# Patient Record
Sex: FEMALE | Race: WHITE | Employment: OTHER | URBAN - METROPOLITAN AREA
[De-identification: names, ages, dates, MRNs, and addresses within clinical notes are randomized per-mention and may not be internally consistent; named-entity substitution may affect disease eponyms.]

---

## 2021-01-13 DIAGNOSIS — Z23 NEED FOR VACCINATION: ICD-10-CM

## 2022-07-08 ENCOUNTER — HOSPITAL ENCOUNTER (OUTPATIENT)
Dept: RADIOLOGY | Facility: MEDICAL CENTER | Age: 82
End: 2022-07-08

## 2023-09-28 ENCOUNTER — HOSPITAL ENCOUNTER (OUTPATIENT)
Dept: RADIOLOGY | Facility: MEDICAL CENTER | Age: 83
End: 2023-09-28
Payer: MEDICARE

## 2024-03-22 ENCOUNTER — APPOINTMENT (OUTPATIENT)
Dept: RADIOLOGY | Facility: MEDICAL CENTER | Age: 84
DRG: 189 | End: 2024-03-22
Attending: STUDENT IN AN ORGANIZED HEALTH CARE EDUCATION/TRAINING PROGRAM
Payer: MEDICARE

## 2024-03-22 ENCOUNTER — HOSPITAL ENCOUNTER (INPATIENT)
Facility: MEDICAL CENTER | Age: 84
LOS: 2 days | DRG: 189 | End: 2024-03-25
Attending: STUDENT IN AN ORGANIZED HEALTH CARE EDUCATION/TRAINING PROGRAM | Admitting: STUDENT IN AN ORGANIZED HEALTH CARE EDUCATION/TRAINING PROGRAM
Payer: MEDICARE

## 2024-03-22 DIAGNOSIS — J44.1 COPD EXACERBATION (HCC): ICD-10-CM

## 2024-03-22 DIAGNOSIS — J96.21 ACUTE ON CHRONIC RESPIRATORY FAILURE WITH HYPOXIA (HCC): ICD-10-CM

## 2024-03-22 DIAGNOSIS — E43 SEVERE PROTEIN-CALORIE MALNUTRITION (HCC): ICD-10-CM

## 2024-03-22 PROBLEM — E46 PROTEIN CALORIE MALNUTRITION (HCC): Status: ACTIVE | Noted: 2024-03-22

## 2024-03-22 PROBLEM — R79.89 ELEVATED BRAIN NATRIURETIC PEPTIDE (BNP) LEVEL: Status: ACTIVE | Noted: 2024-03-22

## 2024-03-22 PROBLEM — J96.01 ACUTE HYPOXIC RESPIRATORY FAILURE (HCC): Status: ACTIVE | Noted: 2024-03-22

## 2024-03-22 PROCEDURE — 94669 MECHANICAL CHEST WALL OSCILL: CPT

## 2024-03-22 PROCEDURE — 99223 1ST HOSP IP/OBS HIGH 75: CPT | Mod: AI | Performed by: STUDENT IN AN ORGANIZED HEALTH CARE EDUCATION/TRAINING PROGRAM

## 2024-03-22 PROCEDURE — 94640 AIRWAY INHALATION TREATMENT: CPT

## 2024-03-22 PROCEDURE — G0378 HOSPITAL OBSERVATION PER HR: HCPCS

## 2024-03-22 PROCEDURE — 700101 HCHG RX REV CODE 250: Performed by: STUDENT IN AN ORGANIZED HEALTH CARE EDUCATION/TRAINING PROGRAM

## 2024-03-22 PROCEDURE — 71045 X-RAY EXAM CHEST 1 VIEW: CPT

## 2024-03-22 RX ORDER — DORZOLAMIDE HYDROCHLORIDE AND TIMOLOL MALEATE 20; 5 MG/ML; MG/ML
1 SOLUTION/ DROPS OPHTHALMIC 2 TIMES DAILY
Status: DISCONTINUED | OUTPATIENT
Start: 2024-03-22 | End: 2024-03-25 | Stop reason: HOSPADM

## 2024-03-22 RX ORDER — PREDNISONE 20 MG/1
40 TABLET ORAL DAILY
Status: DISCONTINUED | OUTPATIENT
Start: 2024-03-23 | End: 2024-03-25 | Stop reason: HOSPADM

## 2024-03-22 RX ORDER — BRIMONIDINE TARTRATE 2 MG/ML
1 SOLUTION/ DROPS OPHTHALMIC 2 TIMES DAILY
Status: DISCONTINUED | OUTPATIENT
Start: 2024-03-23 | End: 2024-03-25 | Stop reason: HOSPADM

## 2024-03-22 RX ORDER — IPRATROPIUM BROMIDE AND ALBUTEROL SULFATE 2.5; .5 MG/3ML; MG/3ML
3 SOLUTION RESPIRATORY (INHALATION)
Status: DISCONTINUED | OUTPATIENT
Start: 2024-03-22 | End: 2024-03-24

## 2024-03-22 RX ORDER — ALBUTEROL SULFATE 90 UG/1
2 AEROSOL, METERED RESPIRATORY (INHALATION)
Status: DISCONTINUED | OUTPATIENT
Start: 2024-03-22 | End: 2024-03-24

## 2024-03-22 RX ORDER — LATANOPROST 50 UG/ML
1 SOLUTION/ DROPS OPHTHALMIC EVERY EVENING
Status: DISCONTINUED | OUTPATIENT
Start: 2024-03-22 | End: 2024-03-25 | Stop reason: HOSPADM

## 2024-03-22 RX ORDER — LISINOPRIL 10 MG/1
10 TABLET ORAL DAILY
Status: DISCONTINUED | OUTPATIENT
Start: 2024-03-23 | End: 2024-03-25 | Stop reason: HOSPADM

## 2024-03-22 RX ORDER — FLUTICASONE PROPIONATE AND SALMETEROL 250; 50 UG/1; UG/1
1 POWDER RESPIRATORY (INHALATION)
Status: DISCONTINUED | OUTPATIENT
Start: 2024-03-22 | End: 2024-03-23

## 2024-03-22 RX ORDER — AZITHROMYCIN 500 MG/5ML
500 INJECTION, POWDER, LYOPHILIZED, FOR SOLUTION INTRAVENOUS EVERY 24 HOURS
Qty: 500 ML | Refills: 0 | Status: DISCONTINUED | OUTPATIENT
Start: 2024-03-23 | End: 2024-03-24

## 2024-03-22 RX ADMIN — IPRATROPIUM BROMIDE AND ALBUTEROL SULFATE 3 ML: .5; 3 SOLUTION RESPIRATORY (INHALATION) at 23:02

## 2024-03-22 ASSESSMENT — ENCOUNTER SYMPTOMS
HALLUCINATIONS: 0
SENSORY CHANGE: 0
EYE DISCHARGE: 0
PALPITATIONS: 0
FOCAL WEAKNESS: 0
BACK PAIN: 0
DIARRHEA: 0
CHILLS: 1
TREMORS: 0
HEADACHES: 0
WEIGHT LOSS: 0
SHORTNESS OF BREATH: 1
NECK PAIN: 0
HEMOPTYSIS: 0
FEVER: 0
SPUTUM PRODUCTION: 1
INSOMNIA: 0
NERVOUS/ANXIOUS: 0
SINUS PAIN: 0
DOUBLE VISION: 0
WHEEZING: 1
DIZZINESS: 0
COUGH: 1
TINGLING: 0
NAUSEA: 0
VOMITING: 0
EYE PAIN: 0
PHOTOPHOBIA: 0
ORTHOPNEA: 0
ABDOMINAL PAIN: 0
SPEECH CHANGE: 0

## 2024-03-22 ASSESSMENT — LIFESTYLE VARIABLES: SUBSTANCE_ABUSE: 0

## 2024-03-22 ASSESSMENT — COPD QUESTIONNAIRES
DO YOU EVER COUGH UP ANY MUCUS OR PHLEGM?: NO/ONLY WITH OCCASIONAL COLDS OR INFECTIONS
DURING THE PAST 4 WEEKS HOW MUCH DID YOU FEEL SHORT OF BREATH: NONE/LITTLE OF THE TIME
COPD SCREENING SCORE: 4
HAVE YOU SMOKED AT LEAST 100 CIGARETTES IN YOUR ENTIRE LIFE: YES

## 2024-03-22 ASSESSMENT — PATIENT HEALTH QUESTIONNAIRE - PHQ9
2. FEELING DOWN, DEPRESSED, IRRITABLE, OR HOPELESS: NOT AT ALL
SUM OF ALL RESPONSES TO PHQ9 QUESTIONS 1 AND 2: 0
1. LITTLE INTEREST OR PLEASURE IN DOING THINGS: NOT AT ALL

## 2024-03-23 ENCOUNTER — APPOINTMENT (OUTPATIENT)
Dept: CARDIOLOGY | Facility: MEDICAL CENTER | Age: 84
DRG: 189 | End: 2024-03-23
Payer: MEDICARE

## 2024-03-23 PROBLEM — J96.20 ACUTE ON CHRONIC RESPIRATORY FAILURE (HCC): Status: ACTIVE | Noted: 2024-03-23

## 2024-03-23 LAB
ALBUMIN SERPL BCP-MCNC: 3 G/DL (ref 3.2–4.9)
ALBUMIN/GLOB SERPL: 1 G/DL
ALP SERPL-CCNC: 114 U/L (ref 30–99)
ALT SERPL-CCNC: 12 U/L (ref 2–50)
ANION GAP SERPL CALC-SCNC: 11 MMOL/L (ref 7–16)
AST SERPL-CCNC: 20 U/L (ref 12–45)
BASOPHILS # BLD AUTO: 0.1 % (ref 0–1.8)
BASOPHILS # BLD: 0.02 K/UL (ref 0–0.12)
BILIRUB SERPL-MCNC: 0.5 MG/DL (ref 0.1–1.5)
BUN SERPL-MCNC: 34 MG/DL (ref 8–22)
CALCIUM ALBUM COR SERPL-MCNC: 9 MG/DL (ref 8.5–10.5)
CALCIUM SERPL-MCNC: 8.2 MG/DL (ref 8.5–10.5)
CHLORIDE SERPL-SCNC: 103 MMOL/L (ref 96–112)
CO2 SERPL-SCNC: 30 MMOL/L (ref 20–33)
CREAT SERPL-MCNC: 0.81 MG/DL (ref 0.5–1.4)
EOSINOPHIL # BLD AUTO: 0 K/UL (ref 0–0.51)
EOSINOPHIL NFR BLD: 0 % (ref 0–6.9)
ERYTHROCYTE [DISTWIDTH] IN BLOOD BY AUTOMATED COUNT: 56.1 FL (ref 35.9–50)
GFR SERPLBLD CREATININE-BSD FMLA CKD-EPI: 71 ML/MIN/1.73 M 2
GLOBULIN SER CALC-MCNC: 3.1 G/DL (ref 1.9–3.5)
GLUCOSE SERPL-MCNC: 130 MG/DL (ref 65–99)
HCT VFR BLD AUTO: 52.2 % (ref 37–47)
HGB BLD-MCNC: 16.7 G/DL (ref 12–16)
IMM GRANULOCYTES # BLD AUTO: 0.08 K/UL (ref 0–0.11)
IMM GRANULOCYTES NFR BLD AUTO: 0.6 % (ref 0–0.9)
LV EJECT FRACT  99904: 60
LV EJECT FRACT MOD 2C 99903: 72.12
LV EJECT FRACT MOD 4C 99902: 64.85
LV EJECT FRACT MOD BP 99901: 68.87
LYMPHOCYTES # BLD AUTO: 0.43 K/UL (ref 1–4.8)
LYMPHOCYTES NFR BLD: 3.1 % (ref 22–41)
MCH RBC QN AUTO: 31.3 PG (ref 27–33)
MCHC RBC AUTO-ENTMCNC: 32 G/DL (ref 32.2–35.5)
MCV RBC AUTO: 97.8 FL (ref 81.4–97.8)
MONOCYTES # BLD AUTO: 0.12 K/UL (ref 0–0.85)
MONOCYTES NFR BLD AUTO: 0.9 % (ref 0–13.4)
NEUTROPHILS # BLD AUTO: 13.41 K/UL (ref 1.82–7.42)
NEUTROPHILS NFR BLD: 95.3 % (ref 44–72)
NRBC # BLD AUTO: 0 K/UL
NRBC BLD-RTO: 0 /100 WBC (ref 0–0.2)
NT-PROBNP SERPL IA-MCNC: ABNORMAL PG/ML (ref 0–125)
PLATELET # BLD AUTO: 213 K/UL (ref 164–446)
PMV BLD AUTO: 10.7 FL (ref 9–12.9)
POTASSIUM SERPL-SCNC: 4.8 MMOL/L (ref 3.6–5.5)
PROCALCITONIN SERPL-MCNC: 0.09 NG/ML
PROT SERPL-MCNC: 6.1 G/DL (ref 6–8.2)
RBC # BLD AUTO: 5.34 M/UL (ref 4.2–5.4)
SODIUM SERPL-SCNC: 144 MMOL/L (ref 135–145)
TROPONIN T SERPL-MCNC: 38 NG/L (ref 6–19)
WBC # BLD AUTO: 14.1 K/UL (ref 4.8–10.8)

## 2024-03-23 PROCEDURE — 83880 ASSAY OF NATRIURETIC PEPTIDE: CPT

## 2024-03-23 PROCEDURE — 99232 SBSQ HOSP IP/OBS MODERATE 35: CPT | Performed by: INTERNAL MEDICINE

## 2024-03-23 PROCEDURE — 97602 WOUND(S) CARE NON-SELECTIVE: CPT

## 2024-03-23 PROCEDURE — 700102 HCHG RX REV CODE 250 W/ 637 OVERRIDE(OP): Performed by: STUDENT IN AN ORGANIZED HEALTH CARE EDUCATION/TRAINING PROGRAM

## 2024-03-23 PROCEDURE — 84145 PROCALCITONIN (PCT): CPT

## 2024-03-23 PROCEDURE — 700111 HCHG RX REV CODE 636 W/ 250 OVERRIDE (IP)

## 2024-03-23 PROCEDURE — 700101 HCHG RX REV CODE 250: Performed by: STUDENT IN AN ORGANIZED HEALTH CARE EDUCATION/TRAINING PROGRAM

## 2024-03-23 PROCEDURE — 84484 ASSAY OF TROPONIN QUANT: CPT

## 2024-03-23 PROCEDURE — 94664 DEMO&/EVAL PT USE INHALER: CPT

## 2024-03-23 PROCEDURE — 96372 THER/PROPH/DIAG INJ SC/IM: CPT

## 2024-03-23 PROCEDURE — 94669 MECHANICAL CHEST WALL OSCILL: CPT

## 2024-03-23 PROCEDURE — 85025 COMPLETE CBC W/AUTO DIFF WBC: CPT

## 2024-03-23 PROCEDURE — 94640 AIRWAY INHALATION TREATMENT: CPT

## 2024-03-23 PROCEDURE — 96365 THER/PROPH/DIAG IV INF INIT: CPT

## 2024-03-23 PROCEDURE — 96375 TX/PRO/DX INJ NEW DRUG ADDON: CPT

## 2024-03-23 PROCEDURE — 770020 HCHG ROOM/CARE - TELE (206)

## 2024-03-23 PROCEDURE — 93306 TTE W/DOPPLER COMPLETE: CPT | Mod: 26 | Performed by: INTERNAL MEDICINE

## 2024-03-23 PROCEDURE — 700105 HCHG RX REV CODE 258: Performed by: STUDENT IN AN ORGANIZED HEALTH CARE EDUCATION/TRAINING PROGRAM

## 2024-03-23 PROCEDURE — A9270 NON-COVERED ITEM OR SERVICE: HCPCS | Performed by: STUDENT IN AN ORGANIZED HEALTH CARE EDUCATION/TRAINING PROGRAM

## 2024-03-23 PROCEDURE — 80053 COMPREHEN METABOLIC PANEL: CPT

## 2024-03-23 PROCEDURE — 93306 TTE W/DOPPLER COMPLETE: CPT

## 2024-03-23 PROCEDURE — 700111 HCHG RX REV CODE 636 W/ 250 OVERRIDE (IP): Performed by: STUDENT IN AN ORGANIZED HEALTH CARE EDUCATION/TRAINING PROGRAM

## 2024-03-23 PROCEDURE — 36415 COLL VENOUS BLD VENIPUNCTURE: CPT

## 2024-03-23 RX ORDER — FUROSEMIDE 10 MG/ML
20 INJECTION INTRAMUSCULAR; INTRAVENOUS ONCE
Status: COMPLETED | OUTPATIENT
Start: 2024-03-23 | End: 2024-03-23

## 2024-03-23 RX ORDER — ENOXAPARIN SODIUM 100 MG/ML
30 INJECTION SUBCUTANEOUS DAILY
Status: DISCONTINUED | OUTPATIENT
Start: 2024-03-23 | End: 2024-03-25 | Stop reason: HOSPADM

## 2024-03-23 RX ADMIN — PREDNISONE 40 MG: 20 TABLET ORAL at 05:39

## 2024-03-23 RX ADMIN — BRIMONIDINE TARTRATE 1 DROP: 2 SOLUTION OPHTHALMIC at 04:26

## 2024-03-23 RX ADMIN — IPRATROPIUM BROMIDE AND ALBUTEROL SULFATE 3 ML: .5; 3 SOLUTION RESPIRATORY (INHALATION) at 02:35

## 2024-03-23 RX ADMIN — LISINOPRIL 10 MG: 10 TABLET ORAL at 05:39

## 2024-03-23 RX ADMIN — IPRATROPIUM BROMIDE AND ALBUTEROL SULFATE 3 ML: .5; 3 SOLUTION RESPIRATORY (INHALATION) at 22:32

## 2024-03-23 RX ADMIN — IPRATROPIUM BROMIDE AND ALBUTEROL SULFATE 3 ML: .5; 3 SOLUTION RESPIRATORY (INHALATION) at 19:12

## 2024-03-23 RX ADMIN — MOMETASONE FUROATE AND FORMOTEROL FUMARATE DIHYDRATE 2 PUFF: 200; 5 AEROSOL RESPIRATORY (INHALATION) at 19:15

## 2024-03-23 RX ADMIN — FUROSEMIDE 20 MG: 10 INJECTION INTRAMUSCULAR; INTRAVENOUS at 18:23

## 2024-03-23 RX ADMIN — DORZOLAMIDE HYDROCHLORIDE AND TIMOLOL MALEATE 1 DROP: 20; 5 SOLUTION/ DROPS OPHTHALMIC at 04:21

## 2024-03-23 RX ADMIN — IPRATROPIUM BROMIDE AND ALBUTEROL SULFATE 3 ML: .5; 3 SOLUTION RESPIRATORY (INHALATION) at 07:23

## 2024-03-23 RX ADMIN — IPRATROPIUM BROMIDE AND ALBUTEROL SULFATE 3 ML: .5; 3 SOLUTION RESPIRATORY (INHALATION) at 10:27

## 2024-03-23 RX ADMIN — TIOTROPIUM BROMIDE INHALATION SPRAY 5 MCG: 3.12 SPRAY, METERED RESPIRATORY (INHALATION) at 07:28

## 2024-03-23 RX ADMIN — LATANOPROST 1 DROP: 50 SOLUTION OPHTHALMIC at 04:16

## 2024-03-23 RX ADMIN — LATANOPROST 1 DROP: 50 SOLUTION OPHTHALMIC at 16:36

## 2024-03-23 RX ADMIN — BRIMONIDINE TARTRATE 1 DROP: 2 SOLUTION OPHTHALMIC at 16:49

## 2024-03-23 RX ADMIN — DORZOLAMIDE HYDROCHLORIDE AND TIMOLOL MALEATE 1 DROP: 20; 5 SOLUTION/ DROPS OPHTHALMIC at 16:24

## 2024-03-23 RX ADMIN — ENOXAPARIN SODIUM 30 MG: 100 INJECTION SUBCUTANEOUS at 17:06

## 2024-03-23 RX ADMIN — AZITHROMYCIN DIHYDRATE 500 MG: 500 INJECTION, POWDER, LYOPHILIZED, FOR SOLUTION INTRAVENOUS at 05:57

## 2024-03-23 RX ADMIN — CEFTRIAXONE SODIUM 2000 MG: 10 INJECTION, POWDER, FOR SOLUTION INTRAVENOUS at 05:48

## 2024-03-23 ASSESSMENT — PATIENT HEALTH QUESTIONNAIRE - PHQ9
2. FEELING DOWN, DEPRESSED, IRRITABLE, OR HOPELESS: NEARLY EVERY DAY
7. TROUBLE CONCENTRATING ON THINGS, SUCH AS READING THE NEWSPAPER OR WATCHING TELEVISION: NOT AT ALL
3. TROUBLE FALLING OR STAYING ASLEEP OR SLEEPING TOO MUCH: NEARLY EVERY DAY
1. LITTLE INTEREST OR PLEASURE IN DOING THINGS: SEVERAL DAYS
9. THOUGHTS THAT YOU WOULD BE BETTER OFF DEAD, OR OF HURTING YOURSELF: NOT AT ALL
6. FEELING BAD ABOUT YOURSELF - OR THAT YOU ARE A FAILURE OR HAVE LET YOURSELF OR YOUR FAMILY DOWN: SEVERAL DAYS
SUM OF ALL RESPONSES TO PHQ QUESTIONS 1-9: 13
5. POOR APPETITE OR OVEREATING: NOT AT ALL
SUM OF ALL RESPONSES TO PHQ9 QUESTIONS 1 AND 2: 4
4. FEELING TIRED OR HAVING LITTLE ENERGY: MORE THAN HALF THE DAYS
8. MOVING OR SPEAKING SO SLOWLY THAT OTHER PEOPLE COULD HAVE NOTICED. OR THE OPPOSITE, BEING SO FIGETY OR RESTLESS THAT YOU HAVE BEEN MOVING AROUND A LOT MORE THAN USUAL: NEARLY EVERY DAY

## 2024-03-23 ASSESSMENT — COGNITIVE AND FUNCTIONAL STATUS - GENERAL
HELP NEEDED FOR BATHING: TOTAL
STANDING UP FROM CHAIR USING ARMS: TOTAL
PERSONAL GROOMING: A LITTLE
WALKING IN HOSPITAL ROOM: TOTAL
TURNING FROM BACK TO SIDE WHILE IN FLAT BAD: A LITTLE
EATING MEALS: A LITTLE
MOVING FROM LYING ON BACK TO SITTING ON SIDE OF FLAT BED: A LOT
CLIMB 3 TO 5 STEPS WITH RAILING: TOTAL
DRESSING REGULAR UPPER BODY CLOTHING: TOTAL
DAILY ACTIVITIY SCORE: 11
SUGGESTED CMS G CODE MODIFIER DAILY ACTIVITY: CL
SUGGESTED CMS G CODE MODIFIER MOBILITY: CM
MOBILITY SCORE: 9
DRESSING REGULAR LOWER BODY CLOTHING: A LOT
TOILETING: TOTAL
MOVING TO AND FROM BED TO CHAIR: TOTAL

## 2024-03-23 ASSESSMENT — ENCOUNTER SYMPTOMS
SORE THROAT: 0
CHILLS: 0
COUGH: 1
DIZZINESS: 0
WHEEZING: 1
PALPITATIONS: 0
NAUSEA: 0
HEADACHES: 0
BACK PAIN: 0
VOMITING: 0
ABDOMINAL PAIN: 0
MYALGIAS: 0
SHORTNESS OF BREATH: 1
FEVER: 0

## 2024-03-23 ASSESSMENT — LIFESTYLE VARIABLES
EVER HAD A DRINK FIRST THING IN THE MORNING TO STEADY YOUR NERVES TO GET RID OF A HANGOVER: NO
HAVE PEOPLE ANNOYED YOU BY CRITICIZING YOUR DRINKING: NO
TOTAL SCORE: 0
AVERAGE NUMBER OF DAYS PER WEEK YOU HAVE A DRINK CONTAINING ALCOHOL: 7
EVER FELT BAD OR GUILTY ABOUT YOUR DRINKING: NO
DOES PATIENT WANT TO STOP DRINKING: NO
HAVE YOU EVER FELT YOU SHOULD CUT DOWN ON YOUR DRINKING: NO
ON A TYPICAL DAY WHEN YOU DRINK ALCOHOL HOW MANY DRINKS DO YOU HAVE: 1
CONSUMPTION TOTAL: INCOMPLETE
ALCOHOL_USE: YES
TOTAL SCORE: 0
TOTAL SCORE: 0

## 2024-03-23 NOTE — RESPIRATORY CARE
"  COPD EDUCATION by COPD CLINICAL EDUCATOR  3/23/2024  at  10:51 AM by Ara James, RRT     Patient interviewed by education team.  Patient declined to participate in the full bedside program.  Therefore, a short intervention has been conducted.  A comprehensive packet including information about COPD, types of treatments to manage their disease and safe home Oxygen usage was provided and reviewed with patient at the bedside. She lives in [place], [place] with family close by. Her PCP manages here lung disease.Reviewed care and cleaning of her respiratory equipment (nebulizer, flutter). Provided spacer for her rescue inhaler with instruction.    COPD Screen  COPD Risk Screening  Do you have a history of COPD?: Yes  Do you have a Pulmonologist?: No  COPD Population Screener  During the past 4 weeks, how much did you feel short of breath?: None/Little of the time  Do you ever cough up any mucus or phlegm?: No/only with occasional colds or infections  In the past 12 months, you do less than you used to because of your breathing problems: Disagree/unsure  Have you smoked at least 100 cigarettes in your entire life?: Yes  How old are you?: 60+  COPD Screening Score: 4  COPD Coordinator Not Recommended: Yes    COPD Assessment  COPD Clinical Specialists ONLY  COPD Education Initiated: Yes--Short Intervention  Is this a COPD exacerbation patient?: Yes ([facility] team following)  [employer]: none prior  Physician Name: Shara Villareal in [place] per pt. Family to schedule discharge appt within 7-10 days  Pulmonologist Name: PCP manages; gave here regional list of groups  Referrals Initiated: Yes  Smoking Cessation: N/A (quit years ago)  Home Health Care:  (TBD at this encounter)  Mobile Urgent Care Services: N/A (out of area)  [facility]: N/A  $ Demo/Eval of SVN's, MDI's and Aerosols: Yes  Interdisciplinary Rounds: Attendance at Rounds (30 Min)    PFT Results  No results found for: \"PFT\"  (OP) Pulmonary Function " "Testing:  (pt denies PFT testing)    [facility], [facility] within [facility] provides bedside medication delivery for all eligible patients at discharge.  Would you like to opt out of this program for any reason?: No - Stay Opted In  Reason the patient would like to opt out of Bedside Medication Delivery at Discharge?: Patient prefers another pharmacy ([facility])     MY COPD ACTION PLAN     It is recommended that patients and physicians /healthcare providers complete this action plan together. This plan should be discussed at each physician visit and updated as needed.    The green, yellow and red zones show groups of symptoms of COPD. This list of symptoms is not comprehensive, and you may experience other symptoms. In the \"Actions\" column, your healthcare provider has recommended actions for you to take based on your symptoms.    Patient Name: Yesenia Dove   YOB: 1940   Last Updated on: 3/23/2024 10:51 AM   Green Zone:  I am doing well today Actions     Usual activitiy and exercise level   Take daily medications     Usual amounts of cough and phlegm/mucus   Use oxygen as prescribed     Sleep well at night   Continue regular exercise/diet plan     Appetite is good   At all times avoid cigarette smoke, inhaled irritants     Daily Medications (these medications are taken every day):   Fluticasone and Salmeterol (Wixela)  Tiotropium Bromide Monohydrate (Spiriva) 1 Puff  2 Puffs Twice daily  Once daily     Additional Information:  Remember to rinse mouth after using your Wixela    Yellow Zone:  I am having a bad day or a COPD flare Actions     More breathless than usual   Continue daily medications     I have less energy for my daily activities   Use quick relief inhaler as ordered     Increased or thicker phlegm/mucus   Use oxygen as prescribed     Using quick relief inhaler/nebulizer more often   Get plenty of rest     Swelling of ankles more than usual   Use pursed lip breathing     More coughing than usual   " "At all times avoid cigarette smoke, inhaled irritants     I feel like I have a \"chest cold\"     Poor sleep and my symptoms woke me up     My appetite is not good     My medicine is not helping      Call provider immediately if symptoms don’t improve     Continue daily medications, add rescue medications:   Albuterol  Albuterol 3mL via nebulizer  2 Puffs          Medications to be used during a flare up, (as Discussed with Provider):           Additional Information:  Use your rescue medication as directed by your Doctor  Keep your nebulizer clean daily    Red Zone:  I need urgent medical care Actions     Severe shortness of breath even at rest   Call 911 or seek medical care immediately     Not able to do any activity because of breathing      Fever or shaking chills      Feeling confused or very drowsy       Chest pains      Coughing up blood                  "

## 2024-03-23 NOTE — CARE PLAN
Respiratory Update    Q4 duoneb and QID PEP  Pt tolerating current treatments well with no adverse reactions.     Problem: Bronchoconstriction  Goal: Improve in air movement and diminished wheezing  Description: Target End Date:  2 to 3 days    1.  Implement inhaled treatments  2.  Evaluate and manage medication effects  Outcome: Progressing

## 2024-03-23 NOTE — THERAPY
Physical Therapy Contact Note    Patient Name: Yesenia Dove  Age:  83 y.o., Sex:  female  Medical Record #: [id number]  Today's Date: 3/23/2024    PT consult received, chart reviewed will follow for full eval; of note, chest xray reporting proximal right humeral fx without note in MD note; please clarify WB/reason prior to PT evaluation;     Mabel BLOCK, PT,  [phone]

## 2024-03-23 NOTE — PROGRESS NOTES
Pt. Is A&Ox4, denies chest pain, denies pain, upon assessment pt. Noted to have increased sob with communication. Pt. Has audible s1 and s2, inspiratory and expiratory wheezes, productive cough scant white secretions noted. RT notified for duoneb tx. Pt. States she does not use supplemental O2 at home, currently on 4L NC at 92%. LBM 3/22/2024, denies abdominal tenderness and passing gas. Pt. Noted to be incontinent of urine, pure wick in place and functioning. 4 eyes skin assessment performed pt. Pictures taken and uploaded to chart. Notable stage 2 pressure ulcer to sacrum, site cleansed with soap and water, barrier cream in place, mepelex in place, pt. Q2 hr turn, x 1 staff assist, tolerates well, JESU matress in place. Redness to the rt. and lt. lower extremities, +2 bilat lower extremity noted, +2 pedal pulses, cap refill less than 3 sec, extremities negative for skin breakdown, heel protectors in place. SCDs in place and functioning.   Nutrition pt. States she has experienced weight loss due to full dentures. States chewing is difficult.   Pt. Oriented to room and facility, educated to call for assistance as last known fall was x6 mo, bed in lowest position, call light in place, bed alarm in place, pt. Educated to call light system, verbalized understanding.

## 2024-03-23 NOTE — ASSESSMENT & PLAN NOTE
Patient with known history of COPD.  She has good outpatient follow-up.  She takes Breo and albuterol at home  -DuoNebs and albuterol as needed  -Respiratory therapy consult  -Titrate oxygen to 88 to 92%

## 2024-03-23 NOTE — HOSPITAL COURSE
83-year-old female with history of COPD not on baseline oxygen at home who presents on 3/22 as a transfer from outside facility for acute on chronic hypoxic respiratory failure. Patient has been having progressively worsening shortness of breath with associated productive cough for over 1 week.  She has been having to use her albuterol inhaler more frequently over the past couple of days.  She was treated with azithromycin and Solu-Medrol at the outside facility and was found to have an elevated BNP concerning for volume overload at that time.  The outside facility did not have echo capabilities, thus she was transferred here.  BNP here was 14,872.  Chest x-ray notable for trace bilateral pleural effusions and pulmonary edema.  She is needing 3 L of oxygen to maintain saturations.  Echo was ordered for further evaluation.  Appropriate COPD exacerbation therapy have been initiated.

## 2024-03-23 NOTE — H&P
Hospital Medicine History & Physical Note    Date of Service  3/22/2024    Primary Care Physician  No primary care provider on file.        Code Status  Full Code    Chief Complaint  No chief complaint on file.      History of Presenting Illness  Yesenia Dove is a 83 y.o. female with past med history of COPD  who presented 3/22/2024 as a transfer from outside facility.  Patient has a known history of COPD, not oxygen dependent at home.  States that over the last week, she has had progressively worsening shortness of breath.  She has had increased mucus production and worsening cough as well.  Denies any fevers, but noticed some chills today.  She does take Wixela and Spiriva at home.  She states that she has been using her albuterol inhaler more frequently over the last few days.  At the outside facility, there is concern for COPD exacerbation, patient was treated with azithromycin and Solu-Medrol.  Patient states that she has had some clinical improvement.  She also had an elevated proBNP, there is some concern for volume overload at that time.  She was given a dose of Lasix as well.    I discussed the plan of care with patient.    Review of Systems  Review of Systems   Constitutional:  Positive for chills. Negative for fever, malaise/fatigue and weight loss.   HENT:  Negative for congestion, ear discharge, ear pain, nosebleeds and sinus pain.    Eyes:  Negative for double vision, photophobia, pain and discharge.   Respiratory:  Positive for cough, sputum production, shortness of breath and wheezing. Negative for hemoptysis.    Cardiovascular:  Negative for chest pain, palpitations and orthopnea.   Gastrointestinal:  Negative for abdominal pain, diarrhea, nausea and vomiting.   Genitourinary:  Negative for dysuria, frequency, hematuria and urgency.   Musculoskeletal:  Negative for back pain and neck pain.   Neurological:  Negative for dizziness, tingling, tremors, sensory change, speech change, focal weakness  and headaches.   Psychiatric/Behavioral:  Negative for hallucinations, substance abuse and suicidal ideas. The patient is not nervous/anxious and does not have insomnia.        Past Medical History   has no past medical history on file.    Surgical History   has no past surgical history on file.     Family History  family history is not on file.   Family history reviewed with patient. There is no family history that is pertinent to the chief complaint.     Social History       Allergies  Not on File    Medications  Prior to Admission Medications   Prescriptions Last Dose Informant Patient Reported? Taking?   ALPHAGAN P 0.1 % Solution   Yes No   SPIRIVA RESPIMAT 2.5 MCG/ACT Aero Soln   Yes No   WIXELA INHUB 250-50 MCG/ACT AEROSOL POWDER, BREATH ACTIVATED   Yes No   dorzolamide-timolol (COSOPT) 22.3-6.8 MG/ML Solution   Yes No   latanoprost (XALATAN) 0.005 % Solution   Yes No   lisinopril (PRINIVIL) 10 MG Tab   Yes No   Sig: Take 10 mg by mouth every day.      Facility-Administered Medications: None       Physical Exam  Temp:  [36.8 °C (98.3 °F)] 36.8 °C (98.3 °F)  Pulse:  [80] 80  Resp:  [16] 16  BP: (132)/(72) 132/72  SpO2:  [90 %] 90 %  Blood Pressure : 132/72   Temperature: 36.8 °C (98.3 °F)   Pulse: 80   Respiration: 16   Pulse Oximetry: 90 %       Physical Exam  Constitutional:       General: She is not in acute distress.     Appearance: Normal appearance. She is normal weight. She is not ill-appearing, toxic-appearing or diaphoretic.   HENT:      Head: Normocephalic and atraumatic.      Nose: Nose normal.      Mouth/Throat:      Mouth: Mucous membranes are moist.   Eyes:      Extraocular Movements: Extraocular movements intact.      Pupils: Pupils are equal, round, and reactive to light.   Cardiovascular:      Rate and Rhythm: Normal rate and regular rhythm.      Pulses: Normal pulses.      Heart sounds: Normal heart sounds. No murmur heard.     No friction rub. No gallop.   Pulmonary:      Effort: Pulmonary  "effort is normal. No respiratory distress.      Breath sounds: No stridor. Wheezing present. No rhonchi or rales.   Chest:      Chest wall: No tenderness.   Abdominal:      General: Abdomen is flat. There is no distension.      Palpations: Abdomen is soft. There is no mass.      Tenderness: There is no abdominal tenderness. There is no guarding or rebound.      Hernia: No hernia is present.   Musculoskeletal:         General: No swelling, tenderness, deformity or signs of injury.      Right lower leg: No edema.      Left lower leg: No edema.      Comments:      Skin:     General: Skin is warm and dry.      Capillary Refill: Capillary refill takes less than 2 seconds.      Coloration: Skin is not jaundiced or pale.      Findings: No bruising, erythema, lesion or rash.   Neurological:      General: No focal deficit present.      Mental Status: She is alert and oriented to person, place, and time. Mental status is at baseline.      Cranial Nerves: No cranial nerve deficit.      Sensory: No sensory deficit.      Motor: No weakness.      Coordination: Coordination normal.   Psychiatric:         Mood and Affect: Mood normal.         Behavior: Behavior normal.         Laboratory:          No results for input(s): \"ALTSGPT\", \"ASTSGOT\", \"ALKPHOSPHAT\", \"TBILIRUBIN\", \"DBILIRUBIN\", \"GAMMAGT\", \"AMYLASE\", \"LIPASE\", \"ALB\", \"PREALBUMIN\", \"GLUCOSE\" in the last 72 hours.      No results for input(s): \"NTPROBNP\" in the last 72 hours.      No results for input(s): \"TROPONINT\" in the last 72 hours.    Imaging:  OUTSIDE IMAGES-DX CHEST   Final Result      DX-CHEST-LIMITED (1 VIEW)    (Results Pending)       X-Ray:  I have personally reviewed the images and compared with prior images.  EKG:  I have personally reviewed the images and compared with prior images.    Assessment/Plan:  Justification for Admission Status  I anticipate this patient will require at least two midnights for appropriate medical management, necessitating inpatient " admission because acute hypoxic respiratory failure secondary to COPD exacerbation    Patient will need a Telemetry bed on MEDICAL service .  The need is secondary to COPD exacerbation.    * COPD exacerbation (HCC)  Assessment & Plan  Patient with known history of COPD.  She has good outpatient follow-up.  She takes Breo and albuterol at home  DuoNebs and albuterol as needed  Respiratory therapy consult  Titrate oxygen to 88 to 92%  Continue with empiric antibiotics, ceftriaxone and azithromycin      Acute hypoxic respiratory failure (HCC)  Assessment & Plan  Patient currently wearing 4 L supplemental oxygen.  She is on oxygen pendant at home.  Likely secondary to COPD exacerbation  Continue with home inhalers  Albuterol DuoNebs as needed  RT protocol  Continue ceftriaxone azithromycin  Follow-up repeat chest x-ray      Protein calorie malnutrition (HCC)  Assessment & Plan  Nutrition consult placed    Elevated brain natriuretic peptide (BNP) level  Assessment & Plan  Patient was found to have an elevated proBNP at the outside facility.  There is concern for volume overload and lower extremity swelling.  She was given a dose of Lasix  On my exam, patient appears to be euvolemic  Follow-up repeat chest x-ray  No indication for further Lasix at this point  Holding off on echocardiogram at this time        VTE prophylaxis: enoxaparin ppx

## 2024-03-23 NOTE — PROGRESS NOTES
[facility]PITAL[facility] TRIAGE OFFICER DIRECT ADMISSION REPORT  Transferring facility: [facility]  Transferring physician: Dr. Christensen  Chief complaint: Shortness of breath  Pertinent history & patient course: 83-year-old female with history of COPD, high functioning presents to outside facility ER for 1 week of shortness of breath.  Found to be hypoxic with oxygenation in low 70s, responded to 2 L nasal cannula.  At outside facility, found to have BNP 1000, troponin 90.  EKG showing normal sinus rhythm with T wave inversion in leads V3 and V4 (however seen on previous EKGs).  Treated with Lasix, loading dose of aspirin, Solu-Medrol, Zithromax, DuoNebs.  Request be transferred for COPD exacerbation/suspected new onset CHF exacerbation.  No TTE available at outside facility.  Code Status: Full  Further work up or recommendations per triage officer prior to transfer: None  Consultants called prior to transfer and pertinent input from consultants: None  Patient accepted for transfer: Yes  Consultants to be called upon arrival: None needed at this time  Admission status: Inpatient.   Floor requested: CDU       Please inform the triage officer upon arrival of the patient to [facility] for assignment of a hospitalist to perform admission.      For any question or concerns regarding the care of this patient, please reach out to the assigned hospita

## 2024-03-23 NOTE — ASSESSMENT & PLAN NOTE
Patient with history of COPD not on baseline oxygen.  Currently using 3 L supplemental oxygen to maintain saturations, weaning as tolerated.  Likely secondary to COPD exacerbation versus heart failure.   - RT protocol  - Torsten Solorio  - Prednisone 40 mg x 5 days (3/23 to 3/27)  - Azithromycin 500 mg with last dose 3/24  - Echo: EF 60%, mild to moderate aortic insufficiency, biatrial dilation  - IV Lasix 20 mg once; significant improvement in BNP. ? Severe AECOPD

## 2024-03-23 NOTE — CARE PLAN
The patient is Watcher - Medium risk of patient condition declining or worsening    Shift Goals  Clinical Goals: Maintain Oxygenation  Patient Goals: Call for safety, Maintian oxygenation.    Progress made toward(s) clinical / shift goals:      Problem: Knowledge Deficit - Standard  Goal: Patient and family/care givers will demonstrate understanding of plan of care, disease process/condition, diagnostic tests and medications  Description: Target End Date:  1-3 days or as soon as patient condition allows    Document in Patient Education    1.  Patient and family/caregiver oriented to unit, equipment, visitation policy and means for communicating concern  2.  Complete/review Learning Assessment  3.  Assess knowledge level of disease process/condition, treatment plan, diagnostic tests and medications  4.  Explain disease process/condition, treatment plan, diagnostic tests and medications  Outcome: Progressing     Problem: Knowledge Deficit - COPD  Goal: Patient/significant other demonstrates understanding of disease process, utilization of the Action Plan, medications and discharge instruction  Description: Target End Date:  1-3 days or as soon as patient condition allows    Document in Patient Education    1.  Discuss the importance of medical follow-up care, periodic chest x-rays, sputum cultures  2.  Review worsening signs and symptoms of COPD flare and exacerbation and its management  3.  Discuss respiratory medications, side effects, adverse reactions  4.  Demonstrate technique for using a metered-dose inhaler (MDI) and utilization of a spacer  5.  Review the COPD Action Plan  6.  Instruct and reinforce the rationale for breathing exercises, coughing effectively, and general conditioning exercises  7.  Stress importance of oral care and dental hygiene  8.  Discuss the importance of avoiding people with active respiratory infections and need for routine influenza and pneumococcal vaccinations  9.  Discuss factors  that may trigger condition and encourage patient/significant other to explore ways to control these factors in and around the home and work setting  10. Review the harmful effects of smoking and advise cessation of smoking  11. Provide information about activity limitations and alternating activities with rest periods to prevent fatigue  12. Instruct asthmatic patient of use of peak flow meter, as appropriate  13. Review oxygen requirements and dosage, safe use of oxygen, and refer to the supplier as indicated  14. Educate patient/family/caregiver on the use of Nasal Intermittent Positive Pressure Ventilation (NIPPV) and possible adverse reactions  Outcome: Progressing     Problem: Risk for Infection - COPD  Goal: Patient will remain free from signs and symptoms of infection  Description: Target End Date:  Prior to discharge or change in level of care    1.  Infection prevention measures  2.  Instruct patient regarding signs and symptoms of infection  3.  Review the importance of breathing exercises, effective cough, frequent position changes, and adequate fluid intake  4.  Recommend rinsing mouth with water and spitting, not swallowing, or use of a spacer on the mouthpiece of inhaled corticosteroids  Outcome: Progressing     Problem: Nutrition - Advanced  Goal: Patient will display progressive weight gain toward goal have adequate food and fluid intake  Description: Target End Date:  Prior to discharge or change in level of care    1.  Daily weights  2.  Monitor dietary intake  3.  Promote systemic fluid hydration within cardiac tolerance  4.  Albumin and PreAlbumin per provider order  5.  Enteral and parenteral nutrition per provider order  6.  Calorie count  7.  Provide oral care  8.  Collaborate with Clinical Dietician  9.  Consider Speech Therapy consult for swallowing difficulties  10. Administer supplemental oxygen during meals as indicated  Outcome: Progressing     Problem: Ineffective Airway  Clearance  Goal: Patient will maintain patent airway with clear/clearing breath sounds  Description: Target End Date:  Prior to discharge or change in level of care    1.   Position head midline with flexion appropriate for age and/or condition  2.   Assist patient to assume a position of comfort  3.   Assess and monitor breath sounds  4.   Encourage abdominal or pursed-lip breathing exercises  5.   Assist with measures to improve effectiveness of cough effort  6.   Demonstrate effective coughing and deep-breathing techniques  7.   Assist patient to turn every 2 hours  8.   Encourage patient to ambulate as tolerated  9.   Keep environmental pollution to a minimum  10. Airway suctioning as needed  11. Collaborate with RT to administer medications/treatments per order  12. Promote systemic fluid hydration within cardiac tolerance  13. Provide warm or tepid liquids  Outcome: Progressing     Problem: Impaired Gas Exchange  Goal: Patient will demonstrate improved ventilation and adequate oxygenation and participate in treatment regimen within the level of ability/situation.  Description: Target End Date:  Prior to discharge or change in level of care    1.   Assess/monitor rate/rhythm/depth of effort of respirations  2.   Assess oxygenation as ordered  3.   Administer/titrate oxygen as ordered  4.   Position patient for maximum ventilatory efficiency  5.   Turn, cough, and deep breathe  6.   Vital signs, pulse oximetry  7.   Assess color and body temperature  8.   Assess and monitor breath sounds  9.   Encourage deep-slow or pursed-lip breathing exercises  10. Monitor changes in the level of consciousness and mental status  11. Encourage expectoration of sputum; airway suctioning  12. Elevate the head of the bed and position patient for maximum ventilatory efficiency  13. Provide a calm, quiet environment  14. Limit patient's activity during the acute phase and have patient resume activity gradually and increase as  individually tolerated  15. Evaluate sleep patterns and limit stimulants such as caffeine  16. Collaborate with RT to administer medications/treatments as ordered  Outcome: Progressing     Problem: Risk for Aspiration  Goal: Patient's risk for aspiration will be absent or decrease  Description: Target End Date:  Prior to discharge or change in level of care    1.   Complete dysphagia screening on admission  2.   NPO until dysphagia screening complete or medically cleared  3.   Collaborate with Speech Therapy, Clinical Dietitian and interdisciplinary team  4.   Implement aspiration precautions  5.   Assist patient up to chair for meals  6.   Elevate head of bed 90 degrees if patient is unable to get out of bed  7.   Encourage small bites  8.   Ensure foods/liquids are of appropriate consistency  9.   Assess for any signs/symptoms of aspiration  10. Assess breath sounds and vital signs after oral intake  Outcome: Progressing     Problem: Self Care  Goal: Patient will have the ability to perform ADLs independently or with assistance (bathe, groom, dress, toilet and feed)  Description: Target End Date:  Prior to discharge or change in level of care    Document on ADL flowsheet    1.  Assess the capability and level of deficiency to perform ADLs  2.  Encourage family/care giver involvement  3.  Provide assistive devices  4.  Consider PT/OT evaluations  5.  Maintain support, give positive feedback, encourage self-care allowing extra time and verbal cuing as needed  6.  Avoid doing something for patients they can do themselves, but provide assistance as needed  7.  Assist in anticipating/planning individual needs  8.  Collaborate with Case Management and  to meet discharge needs  Outcome: Progressing     Problem: Skin Integrity  Goal: Skin integrity is maintained or improved  Description: Target End Date:  Prior to discharge or change in level of care    Document interventions on Skin Risk/Alberto flowsheet  groups and corresponding LDA    1.  Assess and monitor skin integrity, appearance and/or temperature  2.  Assess risk factors for impaired skin integrity and/or pressures ulcers  3.  Implement precautions to protect skin integrity in collaboration with interdisciplinary team  4.  Implement pressure ulcer prevention protocol if at risk for skin breakdown  5.  Confirm wound care consult if at risk for skin breakdown  6.  Ensure patient use of pressure relieving devices  (Low air loss bed, waffle overlay, heel protectors, ROHO cushion, etc)  Outcome: Progressing     Problem: Fall Risk  Goal: Patient will remain free from falls  Description: Target End Date:  Prior to discharge or change in level of care    Document interventions on the [facility] Fall Risk Assessment    1.  Assess for fall risk factors  2.  Implement fall precautions  Outcome: Progressing     Problem: Depression  Goal: Patient and family/caregiver will verbalize accurate information about at least two of the possible causes of depression, three-four of the signs and symptoms of depression  Description: Target End Date:  1 to 3 days    1.  Assess the patient's and family/caregiver's knowledge regarding depression and its causes.  2.  Explain to the patient and family/caregiver regarding the major symptoms of depression.  3.  Inform the patient and family/caregiver that depression can be treated through medications and psychotherapy.  4.  Allow the patient to express feelings and perceptions  5.  Express hope to the patient with realistic comments about the patient's strengths and resources.  5.  Give positive feedback after a tasks are achieved.  6.  Encourage identification of positive aspects of self.  7.  Educate the patient about crisis intervention services such as suicide hotlines and other resources.  Outcome: Progressing       Patient is not progressing towards the following goals:

## 2024-03-23 NOTE — ASSESSMENT & PLAN NOTE
Patient was found to have an elevated proBNP at the outside facility.  There is concern for volume overload and lower extremity swelling and she was given dose of Lasix at outside facility.  -BNP here notable to be 14,872 --> 3609  - Chest x-ray notable for pulmonary congestion.  No JVD appreciated.  Questionable orthopnea as she states that she prefers sleeping at an angle for comfort but does not report difficulty breathing when lying flat.  No edema noted.  - Echo: EF 60%, mild to moderate aortic insufficiency with biatrial dilation  - One-time dose of IV Lasix 20 mg on 3/23, with improvement in BNP

## 2024-03-23 NOTE — PROGRESS NOTES
4 Eyes Skin Assessment Completed by Moraima Benitez , RN and Kyra Wells, JUAN MANUEL.    Head Scab  Ears Redness, Dryness behind ears  Nose WDL  Mouth WDL  Neck WDL  Breast/Chest WDL  Shoulder Blades Blanching, Skin tag to the rt. Shoulder.   Spine Blanching  (R) Arm/Elbow/Hand Bruising  (L) Arm/Elbow/Hand WDL  Abdomen WDL  Groin Redness and Excoriation  Scrotum/Coccyx/Buttocks Redness, Non-Blanching, Excoriation, Moisture Fissure, and Discoloration, stage 2 pressure ulcer.  (R) Leg Redness, Bruising, Swelling, and Edema  (L) Leg Redness, Bruising, and Edema swelling   (R) Heel/Foot/Toe WDL, dryness  (L) Heel/Foot/Toe WDL, dryness           Devices In Places Tele Box and Nasal Cannula, Pure wick in place.       Interventions In Place Gray Ear Foams, NC W/Ear Foams, Heel Mepilex, Sacral Mepilex, Q2 Turns, Low Air Loss Mattress, Barrier Cream, and Pressure Redistribution Mattress    Possible Skin Injury Yes    Pictures Uploaded Into Epic Yes  Wound Consult Placed Yes  RN Wound Prevention Protocol Ordered Yes

## 2024-03-23 NOTE — PROGRESS NOTES
Patient B/P taken prior to administration of IV Lasix, was 87 systolic and 47 diastolic. Medication withheld due to hypotensive BP. MD notified during rounds. Per MD, give lasix today whenever BP returns to baseline.

## 2024-03-23 NOTE — DISCHARGE PLANNING
Met with Pt at bedside, A&Ox4, 3L NC, SpO2 93% and able to converse without SOB. Pt lives on the same property, in [place], [place] as her son and dtr in law in a [facility] 'mother in law' type residence.50 ft from main house. No DME, No home O2. Independent with ADL/IADLs and sees her PCP regularly, last visit 3/6.Denies alcohol or drug use. Reports a 'minor' depressive episode 10/2023 after a fall. Smokes a 'few cigarettes' still when depressed. Has MDI at  says she hasn't had to use it in 'a really long time'.  Care Transition Team Assessment    Information Source  Orientation Level: Oriented X4  Information Given By: Patient  Who is responsible for making decisions for patient? : Patient    Readmission Evaluation  Is this a readmission?: No    Elopement Risk  Legal Hold: No  Ambulatory or Self Mobile in Wheelchair: No-Not an Elopement Risk  Elopement Risk: Not at Risk for Elopement    Interdisciplinary Discharge Planning  Patient or legal guardian wants to designate a caregiver: No    Discharge Preparedness  What is your plan after discharge?: Uncertain - pending medical team collaboration  What are your discharge supports?: Child  Prior Functional Level: Ambulatory, Independent with Activities of Daily Living, Independent with Medication Management  Difficulity with ADLs: None  Difficulity with IADLs: None    Functional Assesment  Prior Functional Level: Ambulatory, Independent with Activities of Daily Living, Independent with Medication Management    Finances  Financial Barriers to Discharge: No  Prescription Coverage: Yes    Vision / Hearing Impairment  Vision Impairment : No  Right Eye Vision: Impaired, Other (Comments), 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 states she can see.)  Hearing Impairment : No     Advance Directive  Advance Directive?: None    Domestic Abuse  Have you ever been the victim of abuse or violence?: No  Physical Abuse or  Sexual Abuse: No  Verbal Abuse or Emotional Abuse: No  Possible Abuse/Neglect Reported to:: Not Applicable    Psychological Assessment  History of Substance Abuse: None  History of Psychiatric Problems: No  Non-compliant with Treatment: No  Newly Diagnosed Illness: Yes    Discharge Risks or Barriers  Patient risk factors: Cognitive /Sensory / physical deficit, nutritionally challenged    Anticipated Discharge Information  Discharge Disposition: Discharged to home/self care (01) May need new O2 setup  Discharge Address: Home ([place]  Discharge Contact Phone Number: Flako Erickson ()

## 2024-03-23 NOTE — DIETARY
"Nutrition services: Day 0 of admit.  Yesenia Dove is a 83 y.o. female with admitting DX of acute hypoxic respiratory failure.     Consult received for calorie count, BMI <19, MST 2 and protein calorie malnutrition listed in hospital list. RD clarified calorie count consult with MD and neil for nutritional assessment not calorie count. RD met with pt at bedside. Pt states she had teeth pulled d/t dentures and was without teeth for 4 months. She reports a decrease in intake during that time and weight loss. She got her dentures in January and since then she has been working on gaining weight. She blends ensure with a banana and peanut butter 1x per day. Discussed having 2-3 Ensure per day to help with weight gain. RD to add TID to help bolster nutrition while in acute care. Pt loves sweets. States she has a bearclaw for breakfast. RD to add ice cream with dinners to help with intake.  RD encouraged intake of all meals and supplements as able.       Assessment:  Height: 162.6 cm (5' 4\") (Pt unsure of her height; this is her stated estimate)  Weight: 43 kg (94 lb 12.8 oz) via bed scale   Body mass index is 16.27 kg/m²., BMI classification: underweight   Diet/Intake: Regular diet. No intake recorded. Per pt she ate ~50% of breakfast.     Evaluation:   Pt with BMI <19 and reported weight loss. No weight history per chart review. Pt states her usual weight to be 110-115 lbs. Pt is down 16-21 lbs (14.5%-18.3%)  from reported UBW. This is severe. Pt with severe fat loss noted to triceps and severe muscle wasting to temporalis muscle and clavicle region.   Current clinical picture and MD progress notes reviewed. Pt admitted for SOB x1 week PTA.    Labs (3/23) Glu 130 (H), Ca 8.2 (L)  Meds reviewed   Skin: Stage 2 Pressure ulcer noted to sacrum. 2+ bilateral lower extremity edema noted    +BM 3/22    Malnutrition Risk: Pt meets criteria for severe malnutrition in the context of a social circumstance related to pulling of " teeth and dentures as evidenced by pt reported weight loss of 16-21 lbs (14.5%-18.3%), reported decreased intake (likely </=50% of estimated needs for >/=1 month) and severe fat loss noted to triceps and severe muscle wasting noted to temporalis.     Recommendations/Plan:  Regular diet as tolerated   Addition of nutritional supplements TID    Encourage intake of all meals and supplements   Document intake of all meals as % taken in ADL's to provide interdisciplinary communication across all shifts.   Monitor weight.  Nutrition rep will continue to see patient for ongoing meal and snack preferences.     RD following

## 2024-03-23 NOTE — PROGRESS NOTES
[facility] Internal Medicine Daily Progress Note    Date of Service  3/23/2024    [facility] Team: R IM Purple Team   Attending: Angela Beckford M.d.  Senior Resident: Dr. Gtz  Intern:  Dr. Rubio  Contact Number: [phone]    Chief Complaint  Yesenia Dove is a 83 y.o. female admitted 3/22/2024 with shortness of breath    Hospital Course  83-year-old female with history of COPD not on baseline oxygen at home who presents on 3/22 as a transfer from outside facility for acute on chronic hypoxic respiratory failure. Patient has been having progressively worsening shortness of breath with associated productive cough for over 1 week.  She has been having to use her albuterol inhaler more frequently over the past couple of days.  She was treated with azithromycin and Solu-Medrol at the outside facility and was found to have an elevated BNP concerning for volume overload at that time.  The outside facility did not have echo capabilities, thus she was transferred here.  BNP here was 14,872.  Chest x-ray notable for trace bilateral pleural effusions and pulmonary edema.  She is needing 3 L of oxygen to maintain saturations.  Echo was ordered for further evaluation.  Appropriate COPD exacerbation therapy have been initiated.    Interval Problem Update  Patient here from outside facility.  She states that her breathing feels a lot better than before.  She denies any chest pain, fever, chills, nausea, or vomiting.  Chest x-ray concerning for volume overload.  Will give her a one-time dose of IV Lasix 20 mg and reassess clinical symptoms.  Pending echo for evaluation of heart failure.    I have discussed this patient's plan of care and discharge plan at IDT rounds today with Case Management, Nursing, Nursing leadership, and other members of the IDT team.    Consultants/Specialty    Code Status  Full Code    Disposition  The patient is not medically cleared for discharge to home or a post-acute facility.      I have placed the  appropriate orders for post-discharge needs.    Review of Systems  Review of Systems   Constitutional:  Negative for chills and fever.   HENT:  Negative for congestion and sore throat.    Respiratory:  Positive for cough, shortness of breath and wheezing.    Cardiovascular:  Negative for chest pain and palpitations.   Gastrointestinal:  Negative for abdominal pain, nausea and vomiting.   Genitourinary:  Negative for dysuria and hematuria.   Musculoskeletal:  Negative for back pain and myalgias.   Skin:  Negative for itching and rash.   Neurological:  Negative for dizziness and headaches.        Physical Exam  Temp:  [36.4 °C (97.5 °F)-36.9 °C (98.4 °F)] 36.4 °C (97.5 °F)  Pulse:  [52-80] 68  Resp:  [16-18] 18  BP: (101-132)/(52-78) 116/63  SpO2:  [90 %-95 %] 91 %    Physical Exam  Constitutional:       General: She is not in acute distress.     Appearance: Normal appearance.   HENT:      Head: Normocephalic and atraumatic.   Eyes:      Extraocular Movements: Extraocular movements intact.      Pupils: Pupils are equal, round, and reactive to light.   Cardiovascular:      Rate and Rhythm: Normal rate and regular rhythm.   Pulmonary:      Effort: Pulmonary effort is normal.      Breath sounds: Wheezing present.   Abdominal:      General: There is no distension.      Palpations: Abdomen is soft.   Musculoskeletal:      Right lower leg: No edema.      Left lower leg: No edema.   Neurological:      General: No focal deficit present.      Mental Status: She is oriented to person, place, and time.         Fluids    Intake/Output Summary (Last 24 hours) at 3/23/2024 1045  Last data filed at 3/23/2024 0829  Gross per 24 hour   Intake 0 ml   Output 0 ml   Net 0 ml       Laboratory  Recent Labs     03/23/24 0213   WBC 14.1*   RBC 5.34   HEMOGLOBIN 16.7*   HEMATOCRIT 52.2*   MCV 97.8   MCH 31.3   MCHC 32.0*   RDW 56.1*   PLATELETCT 213   MPV 10.7     Recent Labs     03/23/24 0213   SODIUM 144   POTASSIUM 4.8   CHLORIDE 103    CO2 30   GLUCOSE 130*   BUN 34*   CREATININE 0.81   CALCIUM 8.2*                   Imaging  DX-CHEST-LIMITED (1 VIEW)   Final Result      1.  Likely trace bilateral pleural effusions.   2.  Interstitial infiltrates and/or edema.   3.  Retrocardiac airspace disease.   4.  Proximal right humeral fracture.      OUTSIDE IMAGES-DX CHEST   Final Result      EC-ECHOCARDIOGRAM COMPLETE W/O CONT    (Results Pending)        Assessment/Plan  Problem Representation:    * Acute hypoxic respiratory failure (HCC)  Assessment & Plan  Patient with history of COPD not on baseline oxygen.  Currently using 3 L supplemental oxygen to maintain saturations, weaning as tolerated.  Likely secondary to COPD exacerbation versus heart failure.   - RT protocol  - DuoNebs, Dulera  - Prednisone 40 mg x 5 days (3/23 to 3/27)  - Azithromycin 500 mg with last dose 3/24  - Pending echo  - IV Lasix 20 mg once, then will reassess need for further diuresis    Elevated brain natriuretic peptide (BNP) level- (present on admission)  Assessment & Plan  Patient was found to have an elevated proBNP at the outside facility.  There is concern for volume overload and lower extremity swelling and she was given dose of Lasix at outside facility.  BNP here notable to be 14,872  Chest x-ray notable for pulmonary congestion.  No JVD appreciated.  Questionable orthopnea as she states that she prefers sleeping at an angle for comfort but does not report difficulty breathing when lying flat.  No edema noted.  - Pending echo  - One-time dose of IV Lasix 20 mg on 3/23, and then will reassess clinical symptoms for further diuresis    Protein calorie malnutrition (HCC)  Assessment & Plan  Nutrition consult placed    COPD exacerbation (HCC)  Assessment & Plan  Patient with known history of COPD.  She has good outpatient follow-up.  She takes Breo and albuterol at home  DuoNebs and albuterol as needed  Respiratory therapy consult  Titrate oxygen to 88 to 92%           VTE  prophylaxis: enoxaparin ppx    I have performed a physical exam and reviewed and updated ROS and Plan today (3/23/2024). In review of yesterday's note (3/22/2024), there are no changes except as documented above.

## 2024-03-23 NOTE — CARE PLAN
The patient is Watcher - Medium risk of patient condition declining or worsening    Shift Goals  Clinical Goals: Maintain Oxygenation  Patient Goals: Call for safety, Maintian oxygenation.      Problem: Knowledge Deficit - Standard  Goal: Patient and family/care givers will demonstrate understanding of plan of care, disease process/condition, diagnostic tests and medications  Outcome: Progressing  Note: Pt educated on current POC, expected outcomes and goals, and new medications ordered. All questions and concerns have been answered at this time. MD educated pt on disease pathology and work up.        Problem: Skin Integrity  Goal: Skin integrity is maintained or improved  Outcome: Progressing  Note: Pt's skin assessed, JESU, mepilex, pillows for support of turning, Offloading, Q2 hour turns in place and discussed with interdisciplinary team.

## 2024-03-24 PROBLEM — R00.1 BRADYCARDIA: Status: ACTIVE | Noted: 2024-03-24

## 2024-03-24 PROBLEM — R94.31 T WAVE INVERSION IN EKG: Status: ACTIVE | Noted: 2024-03-24

## 2024-03-24 LAB
ANION GAP SERPL CALC-SCNC: 8 MMOL/L (ref 7–16)
BASOPHILS # BLD AUTO: 0.1 % (ref 0–1.8)
BASOPHILS # BLD: 0.02 K/UL (ref 0–0.12)
BUN SERPL-MCNC: 43 MG/DL (ref 8–22)
CALCIUM SERPL-MCNC: 8.1 MG/DL (ref 8.5–10.5)
CHLORIDE SERPL-SCNC: 104 MMOL/L (ref 96–112)
CO2 SERPL-SCNC: 29 MMOL/L (ref 20–33)
CREAT SERPL-MCNC: 0.71 MG/DL (ref 0.5–1.4)
EKG IMPRESSION: NORMAL
EOSINOPHIL # BLD AUTO: 0 K/UL (ref 0–0.51)
EOSINOPHIL NFR BLD: 0 % (ref 0–6.9)
ERYTHROCYTE [DISTWIDTH] IN BLOOD BY AUTOMATED COUNT: 57.5 FL (ref 35.9–50)
GFR SERPLBLD CREATININE-BSD FMLA CKD-EPI: 83 ML/MIN/1.73 M 2
GLUCOSE SERPL-MCNC: 81 MG/DL (ref 65–99)
HCT VFR BLD AUTO: 47.1 % (ref 37–47)
HGB BLD-MCNC: 15.3 G/DL (ref 12–16)
IMM GRANULOCYTES # BLD AUTO: 0.13 K/UL (ref 0–0.11)
IMM GRANULOCYTES NFR BLD AUTO: 0.8 % (ref 0–0.9)
LYMPHOCYTES # BLD AUTO: 0.97 K/UL (ref 1–4.8)
LYMPHOCYTES NFR BLD: 6.1 % (ref 22–41)
MAGNESIUM SERPL-MCNC: 2.1 MG/DL (ref 1.5–2.5)
MCH RBC QN AUTO: 32.1 PG (ref 27–33)
MCHC RBC AUTO-ENTMCNC: 32.5 G/DL (ref 32.2–35.5)
MCV RBC AUTO: 98.9 FL (ref 81.4–97.8)
MONOCYTES # BLD AUTO: 1.34 K/UL (ref 0–0.85)
MONOCYTES NFR BLD AUTO: 8.5 % (ref 0–13.4)
NEUTROPHILS # BLD AUTO: 13.33 K/UL (ref 1.82–7.42)
NEUTROPHILS NFR BLD: 84.5 % (ref 44–72)
NRBC # BLD AUTO: 0 K/UL
NRBC BLD-RTO: 0 /100 WBC (ref 0–0.2)
NT-PROBNP SERPL IA-MCNC: 3609 PG/ML (ref 0–125)
PLATELET # BLD AUTO: 212 K/UL (ref 164–446)
PMV BLD AUTO: 10.9 FL (ref 9–12.9)
POTASSIUM SERPL-SCNC: 4.5 MMOL/L (ref 3.6–5.5)
RBC # BLD AUTO: 4.76 M/UL (ref 4.2–5.4)
SODIUM SERPL-SCNC: 141 MMOL/L (ref 135–145)
TROPONIN T SERPL-MCNC: 41 NG/L (ref 6–19)
WBC # BLD AUTO: 15.8 K/UL (ref 4.8–10.8)

## 2024-03-24 PROCEDURE — 700102 HCHG RX REV CODE 250 W/ 637 OVERRIDE(OP): Performed by: STUDENT IN AN ORGANIZED HEALTH CARE EDUCATION/TRAINING PROGRAM

## 2024-03-24 PROCEDURE — 36415 COLL VENOUS BLD VENIPUNCTURE: CPT

## 2024-03-24 PROCEDURE — 93005 ELECTROCARDIOGRAM TRACING: CPT | Performed by: INTERNAL MEDICINE

## 2024-03-24 PROCEDURE — 700111 HCHG RX REV CODE 636 W/ 250 OVERRIDE (IP)

## 2024-03-24 PROCEDURE — 700101 HCHG RX REV CODE 250

## 2024-03-24 PROCEDURE — 80048 BASIC METABOLIC PNL TOTAL CA: CPT

## 2024-03-24 PROCEDURE — 93010 ELECTROCARDIOGRAM REPORT: CPT | Performed by: INTERNAL MEDICINE

## 2024-03-24 PROCEDURE — 700111 HCHG RX REV CODE 636 W/ 250 OVERRIDE (IP): Performed by: STUDENT IN AN ORGANIZED HEALTH CARE EDUCATION/TRAINING PROGRAM

## 2024-03-24 PROCEDURE — 85025 COMPLETE CBC W/AUTO DIFF WBC: CPT

## 2024-03-24 PROCEDURE — 99232 SBSQ HOSP IP/OBS MODERATE 35: CPT | Mod: GC | Performed by: INTERNAL MEDICINE

## 2024-03-24 PROCEDURE — 700102 HCHG RX REV CODE 250 W/ 637 OVERRIDE(OP): Performed by: INTERNAL MEDICINE

## 2024-03-24 PROCEDURE — 84484 ASSAY OF TROPONIN QUANT: CPT

## 2024-03-24 PROCEDURE — 83880 ASSAY OF NATRIURETIC PEPTIDE: CPT

## 2024-03-24 PROCEDURE — 770020 HCHG ROOM/CARE - TELE (206)

## 2024-03-24 PROCEDURE — A9270 NON-COVERED ITEM OR SERVICE: HCPCS | Performed by: INTERNAL MEDICINE

## 2024-03-24 PROCEDURE — 700101 HCHG RX REV CODE 250: Performed by: STUDENT IN AN ORGANIZED HEALTH CARE EDUCATION/TRAINING PROGRAM

## 2024-03-24 PROCEDURE — 94640 AIRWAY INHALATION TREATMENT: CPT

## 2024-03-24 PROCEDURE — A9270 NON-COVERED ITEM OR SERVICE: HCPCS | Performed by: STUDENT IN AN ORGANIZED HEALTH CARE EDUCATION/TRAINING PROGRAM

## 2024-03-24 PROCEDURE — 94669 MECHANICAL CHEST WALL OSCILL: CPT

## 2024-03-24 PROCEDURE — 83735 ASSAY OF MAGNESIUM: CPT

## 2024-03-24 RX ORDER — IPRATROPIUM BROMIDE AND ALBUTEROL SULFATE 2.5; .5 MG/3ML; MG/3ML
3 SOLUTION RESPIRATORY (INHALATION)
Status: DISCONTINUED | OUTPATIENT
Start: 2024-03-24 | End: 2024-03-25

## 2024-03-24 RX ORDER — ALBUTEROL SULFATE 90 UG/1
2 AEROSOL, METERED RESPIRATORY (INHALATION)
Status: DISCONTINUED | OUTPATIENT
Start: 2024-03-24 | End: 2024-03-25 | Stop reason: HOSPADM

## 2024-03-24 RX ORDER — AZITHROMYCIN 250 MG/1
500 TABLET, FILM COATED ORAL DAILY
Qty: 2 TABLET | Refills: 0 | Status: COMPLETED | OUTPATIENT
Start: 2024-03-24 | End: 2024-03-24

## 2024-03-24 RX ADMIN — PREDNISONE 40 MG: 20 TABLET ORAL at 05:49

## 2024-03-24 RX ADMIN — IPRATROPIUM BROMIDE AND ALBUTEROL SULFATE 3 ML: 2.5; .5 SOLUTION RESPIRATORY (INHALATION) at 15:44

## 2024-03-24 RX ADMIN — LATANOPROST 1 DROP: 50 SOLUTION OPHTHALMIC at 16:14

## 2024-03-24 RX ADMIN — DORZOLAMIDE HYDROCHLORIDE AND TIMOLOL MALEATE 1 DROP: 20; 5 SOLUTION/ DROPS OPHTHALMIC at 05:50

## 2024-03-24 RX ADMIN — ENOXAPARIN SODIUM 30 MG: 100 INJECTION SUBCUTANEOUS at 16:14

## 2024-03-24 RX ADMIN — IPRATROPIUM BROMIDE AND ALBUTEROL SULFATE 3 ML: .5; 3 SOLUTION RESPIRATORY (INHALATION) at 02:36

## 2024-03-24 RX ADMIN — MOMETASONE FUROATE AND FORMOTEROL FUMARATE DIHYDRATE 2 PUFF: 200; 5 AEROSOL RESPIRATORY (INHALATION) at 19:09

## 2024-03-24 RX ADMIN — LISINOPRIL 10 MG: 10 TABLET ORAL at 05:49

## 2024-03-24 RX ADMIN — AZITHROMYCIN DIHYDRATE 500 MG: 250 TABLET, FILM COATED ORAL at 08:13

## 2024-03-24 RX ADMIN — DORZOLAMIDE HYDROCHLORIDE AND TIMOLOL MALEATE 1 DROP: 20; 5 SOLUTION/ DROPS OPHTHALMIC at 16:51

## 2024-03-24 RX ADMIN — BRIMONIDINE TARTRATE 1 DROP: 2 SOLUTION OPHTHALMIC at 05:50

## 2024-03-24 RX ADMIN — MOMETASONE FUROATE AND FORMOTEROL FUMARATE DIHYDRATE 2 PUFF: 200; 5 AEROSOL RESPIRATORY (INHALATION) at 07:51

## 2024-03-24 RX ADMIN — TIOTROPIUM BROMIDE INHALATION SPRAY 5 MCG: 3.12 SPRAY, METERED RESPIRATORY (INHALATION) at 07:51

## 2024-03-24 RX ADMIN — IPRATROPIUM BROMIDE AND ALBUTEROL SULFATE 3 ML: 2.5; .5 SOLUTION RESPIRATORY (INHALATION) at 19:08

## 2024-03-24 RX ADMIN — IPRATROPIUM BROMIDE AND ALBUTEROL SULFATE 3 ML: .5; 3 SOLUTION RESPIRATORY (INHALATION) at 07:43

## 2024-03-24 RX ADMIN — BRIMONIDINE TARTRATE 1 DROP: 2 SOLUTION OPHTHALMIC at 17:17

## 2024-03-24 RX ADMIN — IPRATROPIUM BROMIDE AND ALBUTEROL SULFATE 3 ML: .5; 3 SOLUTION RESPIRATORY (INHALATION) at 11:49

## 2024-03-24 ASSESSMENT — ENCOUNTER SYMPTOMS
PALPITATIONS: 0
HEADACHES: 0
MYALGIAS: 0
ABDOMINAL PAIN: 0
FEVER: 0
VOMITING: 0
SHORTNESS OF BREATH: 1
BACK PAIN: 0
COUGH: 1
DIZZINESS: 0
CHILLS: 0
SORE THROAT: 0
WHEEZING: 1
NAUSEA: 0

## 2024-03-24 ASSESSMENT — FIBROSIS 4 INDEX: FIB4 SCORE: 2.31

## 2024-03-24 NOTE — PROGRESS NOTES
[facility] Internal Medicine Daily Progress Note    Date of Service  3/24/2024    [facility] Team: R IM Purple Team   Attending: Angela Beckford M.d.  Senior Resident: Dr. Gtz  Intern:  Dr. Rubio  Contact Number: [phone]    Chief Complaint  Yesenia Dove is a 83 y.o. female admitted 3/22/2024 with shortness of breath    Hospital Course  83-year-old female with history of COPD not on baseline oxygen at home who presents on 3/22 as a transfer from outside facility for acute on chronic hypoxic respiratory failure. Patient has been having progressively worsening shortness of breath with associated productive cough for over 1 week.  She has been having to use her albuterol inhaler more frequently over the past couple of days.  She was treated with azithromycin and Solu-Medrol at the outside facility and was found to have an elevated BNP concerning for volume overload at that time.  The outside facility did not have echo capabilities, thus she was transferred here.  BNP here was 14,872.  Chest x-ray notable for trace bilateral pleural effusions and pulmonary edema.  She is needing 3 L of oxygen to maintain saturations.  Echo was ordered for further evaluation.  Appropriate COPD exacerbation therapy have been initiated.    Interval Problem Update  She states that her breathing feels a lot better than before.  She denies any chest pain, fever, chills, nausea, or vomiting.  Patient's BMP dramatically improved after one-time dose of Lasix IV 20.  Echo demonstrates EF of 60% with mild to moderate aortic insufficiency with biatrial dilation.  EKG demonstrated T wave inversions; contacted [facility] doctor who admitted her and EKGs done in the past have demonstrated chronic T wave inversion.  Patient remained bradycardic throughout the night; however later this a.m. heart rate is in the 60s.    I have discussed this patient's plan of care and discharge plan at IDT rounds today with Case Management, Nursing, Nursing leadership, and  other members of the IDT team.    Consultants/Specialty    Code Status  Full Code    Disposition  Medically Cleared  I have placed the appropriate orders for post-discharge needs.    Review of Systems  Review of Systems   Constitutional:  Negative for chills and fever.   HENT:  Negative for congestion and sore throat.    Respiratory:  Positive for cough, shortness of breath and wheezing.    Cardiovascular:  Negative for chest pain and palpitations.   Gastrointestinal:  Negative for abdominal pain, nausea and vomiting.   Genitourinary:  Negative for dysuria and hematuria.   Musculoskeletal:  Negative for back pain and myalgias.   Skin:  Negative for itching and rash.   Neurological:  Negative for dizziness and headaches.        Physical Exam  Temp:  [36.2 °C (97.2 °F)-36.9 °C (98.4 °F)] 36.9 °C (98.4 °F)  Pulse:  [41-68] 68  Resp:  [16-18] 18  BP: (103-143)/(54-69) 132/66  SpO2:  [80 %-95 %] 93 %    Physical Exam  Constitutional:       General: She is not in acute distress.     Appearance: Normal appearance.   HENT:      Head: Normocephalic and atraumatic.   Eyes:      Extraocular Movements: Extraocular movements intact.      Pupils: Pupils are equal, round, and reactive to light.   Cardiovascular:      Rate and Rhythm: Normal rate and regular rhythm.   Pulmonary:      Effort: Pulmonary effort is normal.      Breath sounds: Rhonchi present. No wheezing.   Abdominal:      General: There is no distension.      Palpations: Abdomen is soft.   Musculoskeletal:      Right lower leg: No edema.      Left lower leg: No edema.   Neurological:      General: No focal deficit present.      Mental Status: She is oriented to person, place, and time.         Fluids    Intake/Output Summary (Last 24 hours) at 3/24/2024 1318  Last data filed at 3/24/2024 1200  Gross per 24 hour   Intake 440 ml   Output 100 ml   Net 340 ml       Laboratory  Recent Labs     03/23/24  0213 03/24/24  0111   WBC 14.1* 15.8*   RBC 5.34 4.76   HEMOGLOBIN  16.7* 15.3   HEMATOCRIT 52.2* 47.1*   MCV 97.8 98.9*   MCH 31.3 32.1   MCHC 32.0* 32.5   RDW 56.1* 57.5*   PLATELETCT 213 212   MPV 10.7 10.9     Recent Labs     03/23/24  0213 03/24/24  0111   SODIUM 144 141   POTASSIUM 4.8 4.5   CHLORIDE 103 104   CO2 30 29   GLUCOSE 130* 81   BUN 34* 43*   CREATININE 0.81 0.71   CALCIUM 8.2* 8.1*                   Imaging  EC-ECHOCARDIOGRAM COMPLETE W/O CONT   Final Result      DX-CHEST-LIMITED (1 VIEW)   Final Result      1.  Likely trace bilateral pleural effusions.   2.  Interstitial infiltrates and/or edema.   3.  Retrocardiac airspace disease.   4.  Proximal right humeral fracture.      OUTSIDE IMAGES-DX CHEST   Final Result           Assessment/Plan  Problem Representation:    * Bradycardia  Assessment & Plan  Has been bradycardic in the 40s to 50s throughout the night.  Today, heart rate improved to the 60s. ?Baseline bradycardic  - Continue to monitor    Elevated brain natriuretic peptide (BNP) level- (present on admission)  Assessment & Plan  Patient was found to have an elevated proBNP at the outside facility.  There is concern for volume overload and lower extremity swelling and she was given dose of Lasix at outside facility.  -BNP here notable to be 14,872 --> 3609  - Chest x-ray notable for pulmonary congestion.  No JVD appreciated.  Questionable orthopnea as she states that she prefers sleeping at an angle for comfort but does not report difficulty breathing when lying flat.  No edema noted.  - Echo: EF 60%, mild to moderate aortic insufficiency with biatrial dilation  - One-time dose of IV Lasix 20 mg on 3/23, with improvement in BNP    Acute hypoxic respiratory failure (HCC)  Assessment & Plan  Patient with history of COPD not on baseline oxygen.  Currently using 3 L supplemental oxygen to maintain saturations, weaning as tolerated.  Likely secondary to COPD exacerbation versus heart failure.   - RT protocol  - Torsten Solorio  - Prednisone 40 mg x 5 days (3/23  to 3/27)  - Azithromycin 500 mg with last dose 3/24  - Echo: EF 60%, mild to moderate aortic insufficiency, biatrial dilation  - IV Lasix 20 mg once; significant improvement in BNP. ? Severe AECOPD    T wave inversion in EKG  Assessment & Plan  Notable on current EKG.  Contacted [facility] admitting doctor for patient in which she confirmed that patient has T wave inversions since 2020.  Troponin is flat.    Patient denies signs and symptoms of chest pain, palpitations, shortness of breath, chills, diaphoresis.  - Fax over EKGs from [facility] into our system    Protein calorie malnutrition (HCC)  Assessment & Plan  - Nutrition consult placed    COPD exacerbation (HCC)  Assessment & Plan  Patient with known history of COPD.  She has good outpatient follow-up.  She takes Breo and albuterol at home  -DuoNebs and albuterol as needed  -Respiratory therapy consult  -Titrate oxygen to 88 to 92%         VTE prophylaxis: enoxaparin ppx    I have performed a physical exam and reviewed and updated ROS and Plan today (3/24/2024). In review of yesterday's note (3/23/2024), there are no changes except as documented above.

## 2024-03-24 NOTE — PROGRESS NOTES
Monitor summary:     Rhythm : SB-SR  Rate : 52-72  Ectopy : PVC'S, PAC'S    UT=.15  QRS=.09  QT=.49        Per telemetry strip summary from monitor room.

## 2024-03-24 NOTE — PROGRESS NOTES
0740: MD notified of patient's heart rate going down to 39, patient denies feeling any changes and has no complaints as of right now. Aox4. MD to round on patient.     1700: discussed with patient regarding home O2 eval, patient requests to do O2 eval in the morning as she is too tired and sleepy to do it now.

## 2024-03-24 NOTE — EEG PROGRESS NOTE
Notified provider Melvi Jay pt. Has a HR of 44 upon night time we discussed Pt is asymptomatic and sleeping. No further orders given.

## 2024-03-24 NOTE — CARE PLAN
The patient is Stable - Low risk of patient condition declining or worsening    Shift Goals  Clinical Goals: Administer abx, improve oxygenation,  Patient Goals: Call for safety, Maintian oxygenation.    Progress made toward(s) clinical / shift goals:      Problem: Knowledge Deficit - Standard  Goal: Patient and family/care givers will demonstrate understanding of plan of care, disease process/condition, diagnostic tests and medications  Description: Target End Date:  1-3 days or as soon as patient condition allows    Document in Patient Education    1.  Patient and family/caregiver oriented to unit, equipment, visitation policy and means for communicating concern  2.  Complete/review Learning Assessment  3.  Assess knowledge level of disease process/condition, treatment plan, diagnostic tests and medications  4.  Explain disease process/condition, treatment plan, diagnostic tests and medications  Outcome: Progressing     Problem: Knowledge Deficit - COPD  Goal: Patient/significant other demonstrates understanding of disease process, utilization of the Action Plan, medications and discharge instruction  Description: Target End Date:  1-3 days or as soon as patient condition allows    Document in Patient Education    1.  Discuss the importance of medical follow-up care, periodic chest x-rays, sputum cultures  2.  Review worsening signs and symptoms of COPD flare and exacerbation and its management  3.  Discuss respiratory medications, side effects, adverse reactions  4.  Demonstrate technique for using a metered-dose inhaler (MDI) and utilization of a spacer  5.  Review the COPD Action Plan  6.  Instruct and reinforce the rationale for breathing exercises, coughing effectively, and general conditioning exercises  7.  Stress importance of oral care and dental hygiene  8.  Discuss the importance of avoiding people with active respiratory infections and need for routine influenza and pneumococcal vaccinations  9.   Discuss factors that may trigger condition and encourage patient/significant other to explore ways to control these factors in and around the home and work setting  10. Review the harmful effects of smoking and advise cessation of smoking  11. Provide information about activity limitations and alternating activities with rest periods to prevent fatigue  12. Instruct asthmatic patient of use of peak flow meter, as appropriate  13. Review oxygen requirements and dosage, safe use of oxygen, and refer to the supplier as indicated  14. Educate patient/family/caregiver on the use of Nasal Intermittent Positive Pressure Ventilation (NIPPV) and possible adverse reactions  Outcome: Progressing     Problem: Risk for Infection - COPD  Goal: Patient will remain free from signs and symptoms of infection  Description: Target End Date:  Prior to discharge or change in level of care    1.  Infection prevention measures  2.  Instruct patient regarding signs and symptoms of infection  3.  Review the importance of breathing exercises, effective cough, frequent position changes, and adequate fluid intake  4.  Recommend rinsing mouth with water and spitting, not swallowing, or use of a spacer on the mouthpiece of inhaled corticosteroids  Outcome: Progressing     Problem: Nutrition - Advanced  Goal: Patient will display progressive weight gain toward goal have adequate food and fluid intake  Description: Target End Date:  Prior to discharge or change in level of care    1.  Daily weights  2.  Monitor dietary intake  3.  Promote systemic fluid hydration within cardiac tolerance  4.  Albumin and PreAlbumin per provider order  5.  Enteral and parenteral nutrition per provider order  6.  Calorie count  7.  Provide oral care  8.  Collaborate with Clinical Dietician  9.  Consider Speech Therapy consult for swallowing difficulties  10. Administer supplemental oxygen during meals as indicated  Outcome: Progressing     Problem: Ineffective Airway  Clearance  Goal: Patient will maintain patent airway with clear/clearing breath sounds  Description: Target End Date:  Prior to discharge or change in level of care    1.   Position head midline with flexion appropriate for age and/or condition  2.   Assist patient to assume a position of comfort  3.   Assess and monitor breath sounds  4.   Encourage abdominal or pursed-lip breathing exercises  5.   Assist with measures to improve effectiveness of cough effort  6.   Demonstrate effective coughing and deep-breathing techniques  7.   Assist patient to turn every 2 hours  8.   Encourage patient to ambulate as tolerated  9.   Keep environmental pollution to a minimum  10. Airway suctioning as needed  11. Collaborate with RT to administer medications/treatments per order  12. Promote systemic fluid hydration within cardiac tolerance  13. Provide warm or tepid liquids  Outcome: Progressing     Problem: Impaired Gas Exchange  Goal: Patient will demonstrate improved ventilation and adequate oxygenation and participate in treatment regimen within the level of ability/situation.  Description: Target End Date:  Prior to discharge or change in level of care    1.   Assess/monitor rate/rhythm/depth of effort of respirations  2.   Assess oxygenation as ordered  3.   Administer/titrate oxygen as ordered  4.   Position patient for maximum ventilatory efficiency  5.   Turn, cough, and deep breathe  6.   Vital signs, pulse oximetry  7.   Assess color and body temperature  8.   Assess and monitor breath sounds  9.   Encourage deep-slow or pursed-lip breathing exercises  10. Monitor changes in the level of consciousness and mental status  11. Encourage expectoration of sputum; airway suctioning  12. Elevate the head of the bed and position patient for maximum ventilatory efficiency  13. Provide a calm, quiet environment  14. Limit patient's activity during the acute phase and have patient resume activity gradually and increase as  individually tolerated  15. Evaluate sleep patterns and limit stimulants such as caffeine  16. Collaborate with RT to administer medications/treatments as ordered  Outcome: Progressing     Problem: Risk for Aspiration  Goal: Patient's risk for aspiration will be absent or decrease  Description: Target End Date:  Prior to discharge or change in level of care    1.   Complete dysphagia screening on admission  2.   NPO until dysphagia screening complete or medically cleared  3.   Collaborate with Speech Therapy, Clinical Dietitian and interdisciplinary team  4.   Implement aspiration precautions  5.   Assist patient up to chair for meals  6.   Elevate head of bed 90 degrees if patient is unable to get out of bed  7.   Encourage small bites  8.   Ensure foods/liquids are of appropriate consistency  9.   Assess for any signs/symptoms of aspiration  10. Assess breath sounds and vital signs after oral intake  Outcome: Progressing     Problem: Self Care  Goal: Patient will have the ability to perform ADLs independently or with assistance (bathe, groom, dress, toilet and feed)  Description: Target End Date:  Prior to discharge or change in level of care    Document on ADL flowsheet    1.  Assess the capability and level of deficiency to perform ADLs  2.  Encourage family/care giver involvement  3.  Provide assistive devices  4.  Consider PT/OT evaluations  5.  Maintain support, give positive feedback, encourage self-care allowing extra time and verbal cuing as needed  6.  Avoid doing something for patients they can do themselves, but provide assistance as needed  7.  Assist in anticipating/planning individual needs  8.  Collaborate with Case Management and  to meet discharge needs  Outcome: Progressing     Problem: Skin Integrity  Goal: Skin integrity is maintained or improved  Description: Target End Date:  Prior to discharge or change in level of care    Document interventions on Skin Risk/Alberto flowsheet  groups and corresponding LDA    1.  Assess and monitor skin integrity, appearance and/or temperature  2.  Assess risk factors for impaired skin integrity and/or pressures ulcers  3.  Implement precautions to protect skin integrity in collaboration with interdisciplinary team  4.  Implement pressure ulcer prevention protocol if at risk for skin breakdown  5.  Confirm wound care consult if at risk for skin breakdown  6.  Ensure patient use of pressure relieving devices  (Low air loss bed, waffle overlay, heel protectors, ROHO cushion, etc)  Outcome: Progressing     Problem: Fall Risk  Goal: Patient will remain free from falls  Description: Target End Date:  Prior to discharge or change in level of care    Document interventions on the [facility] Fall Risk Assessment    1.  Assess for fall risk factors  2.  Implement fall precautions  Outcome: Progressing     Problem: Depression  Goal: Patient and family/caregiver will verbalize accurate information about at least two of the possible causes of depression, three-four of the signs and symptoms of depression  Description: Target End Date:  1 to 3 days    1.  Assess the patient's and family/caregiver's knowledge regarding depression and its causes.  2.  Explain to the patient and family/caregiver regarding the major symptoms of depression.  3.  Inform the patient and family/caregiver that depression can be treated through medications and psychotherapy.  4.  Allow the patient to express feelings and perceptions  5.  Express hope to the patient with realistic comments about the patient's strengths and resources.  5.  Give positive feedback after a tasks are achieved.  6.  Encourage identification of positive aspects of self.  7.  Educate the patient about crisis intervention services such as suicide hotlines and other resources.  Outcome: Progressing       Patient is not progressing towards the following goals:

## 2024-03-24 NOTE — WOUND TEAM
[facility]  Inpatient Services  Initial Wound and Skin Care Evaluation    Admission Date: 3/22/2024     Last order of IP CONSULT TO WOUND CARE was found on 3/23/2024 from Hospital Encounter on 3/22/2024     HPI, PMH, SH: Reviewed    Past Surgical History:   Procedure Laterality Date    TONSILLECTOMY Bilateral      Social History     Tobacco Use    Smoking status: Former     Current packs/day: 0.00     Types: Cigarettes     Quit date: 2024     Years since quittin.0    Smokeless tobacco: Former     Quit date: 2024   Substance Use Topics    Alcohol use: Yes     Alcohol/week: 4.2 oz     Types: 7 Glasses of wine per week     Comment: One glass per day     No chief complaint on file.    Diagnosis: COPD with acute exacerbation (HCC) [J44.1]  Acute on chronic respiratory failure (HCC) [J96.20]    Unit where seen by Wound Team: S1     WOUND CONSULT RELATED TO:  Sacrum    WOUND TEAM PLAN OF CARE - Frequency of Follow-up:   Nursing to follow dressing orders written for wound care. Contact wound team if area fails to progress, deteriorates or with any questions/concerns if something comes up before next scheduled follow up (See below as to whether wound is following and frequency of wound follow up)   Not following, consult as needed  - Sacrum    WOUND HISTORY:   83 y.o. female transferred from outside facility of worsening SOB.       WOUND ASSESSMENT/LDA  Wound 24 Pressure Injury Open Incision Sacrum POA DTI (Active)   Date First Assessed/Time First Assessed: 24   Present on Original Admission: Yes  Hand Hygiene Completed: Yes  Primary Wound Type: Pressure Injury  Surgical Wound Type: Open Incision  Location: Sacrum  Wound Description (Comments): POA DTI      Assessments 3/23/2024  5:00 PM   Wound Image     Site Assessment Purple;Red   Periwound Assessment Fragile;Flaky   Margins Attached edges;Defined edges   Closure Open to air   Drainage Amount None   Treatments  Cleansed;Offloading   Wound Cleansing Foam Cleanser/Washcloth   Periwound Protectant Barrier Paste   Dressing Status Open to Air   NEXT Weekly Photo (Inpatient Only) 03/30/24   Wound Team Following Not following   Pressure Injury Stage Deep Tissue                  Vascular:    RILEY:   No results found.    Lab Values:    Lab Results   Component Value Date/Time    WBC 14.1 (H) 03/23/2024 02:13 AM    RBC 5.34 03/23/2024 02:13 AM    HEMOGLOBIN 16.7 (H) 03/23/2024 02:13 AM    HEMATOCRIT 52.2 (H) 03/23/2024 02:13 AM         Culture Results show:  No results found for this or any previous visit (from the past 720 hour(s)).    Pain Level/Medicated:  None, Tolerated without pain medication       INTERVENTIONS BY WOUND TEAM:  Chart and images reviewed. Discussed with bedside RN. All areas of concern (based on picture review, LDA review and discussion with bedside RN) have been thoroughly assessed. Documentation of areas based on significant findings. This RN in to assess patient. Performed standard wound care which includes appropriate positioning, dressing removal and non-selective debridement. Pictures and measurements obtained weekly if/when required.    Wound:  Sacrum  Preparation for Dressing removal: Removed without difficulty  Cleansed/Non-selectively Debrided with:  No rinse foam soap and Moist warm washcloth  Primary Dressing:  Barrier paste, JOVITA    Advanced Wound Care Discharge Planning  Number of Clinicians necessary to complete wound care: 1  Is patient requiring IV pain medications for dressing changes:  No   Length of time for dressing change 5 min. (This does not include chart review, pre-medication time, set up, clean up or time spent charting.)    Interdisciplinary consultation: Patient, Bedside RN (Lori)    EVALUATION / RATIONALE FOR TREATMENT:     Date:  03/23/24  Wound Status:  Initial evaluation    Sacrum with POA DTI, R coccyx area also with partial thickness wound related to friction. Offloading  adhesive foam was removed at this time as it was soaked in urine.  BL Heels with blanchable erythema, offloading adhesive foams are in place.  BL shins & calves with scattered purple non-blanching discoloration of unknown etiology, okay to leave JOVITA at this time.         Goals: Steady decrease in wound area and depth weekly.    NURSING PLAN OF CARE ORDERS:  RN Prevention Protocol    NUTRITION RECOMMENDATIONS   Wound Team Recommendations:  N/A    DIET ORDERS (From admission to next 24h)       Start     Ordered    03/23/24 1501  Supplements  ALL MEALS        Question Answer Comment   Which Supplement Ensure    Ensure: Ensure Plus Carton        03/23/24 1500    03/22/24 2101  Diet Order Diet: Regular  ALL MEALS        Question:  Diet:  Answer:  Regular    03/22/24 2100                    PREVENTATIVE INTERVENTIONS:    Q shift Alberto - performed per nursing policy  Q shift pressure point assessments - performed per nursing policy    Surface/Positioning  Low Airloss - Currently in Place  Reposition q 2 hours - Currently in Place    Offloading/Redistribution  Sacral offloading dressing (Silicone dressing) - Removed  Heel offloading dressing (Silicone dressing) - Currently in Place  Float Heels off Bed with Pillows - Applied this Visit         Respiratory  Silicone O2 tubing - Currently in Place    Containment/Moisture Prevention    Purwick/Condom Cath - Currently in Place  Barrier paste - Applied this Visit    Anticipated discharge plans:  TBD        Vac Discharge Needs:  Vac Discharge plan is purely a recommendation from wound team and not a requirement for discharge unless otherwise stated by physician.  Not Applicable Pt not on a wound vac

## 2024-03-24 NOTE — ASSESSMENT & PLAN NOTE
Has been bradycardic in the 40s to 50s throughout the night.  Today, heart rate improved to the 60s. ?Baseline bradycardic  - Continue to monitor

## 2024-03-24 NOTE — PROGRESS NOTES
Pt. Educated about Taps system to aid in relieving pressure from her sacrum as she has a stage 2 pressure ulcer. Verbalized understanding. Perineal care performed purewick in place and functioning, pt. Educated about importance of q2 turn, barrier cream application, and pillows in place to relieve pressure. Verbalized understanding will continue to turn. Pt. Reports adequate rest, reports feeling better, oxygenation at 3L sat at 94%.   Bed alarm in place, call light in reach, bed in lowest position. Pt. Educated to call for assistance, verbalized understanding.

## 2024-03-24 NOTE — ASSESSMENT & PLAN NOTE
Notable on current EKG.  Contacted [facility] admitting doctor for patient in which she confirmed that patient has T wave inversions since 2020.  Troponin is flat.    Patient denies signs and symptoms of chest pain, palpitations, shortness of breath, chills, diaphoresis.  - Fax over EKGs from [facility] into our system

## 2024-03-25 VITALS
HEART RATE: 62 BPM | BODY MASS INDEX: 19.2 KG/M2 | DIASTOLIC BLOOD PRESSURE: 72 MMHG | OXYGEN SATURATION: 88 % | WEIGHT: 112.43 LBS | RESPIRATION RATE: 16 BRPM | TEMPERATURE: 97.2 F | HEIGHT: 64 IN | SYSTOLIC BLOOD PRESSURE: 125 MMHG

## 2024-03-25 LAB
ANION GAP SERPL CALC-SCNC: 7 MMOL/L (ref 7–16)
BASOPHILS # BLD AUTO: 0.4 % (ref 0–1.8)
BASOPHILS # BLD: 0.04 K/UL (ref 0–0.12)
BUN SERPL-MCNC: 41 MG/DL (ref 8–22)
CALCIUM SERPL-MCNC: 8.2 MG/DL (ref 8.5–10.5)
CHLORIDE SERPL-SCNC: 103 MMOL/L (ref 96–112)
CO2 SERPL-SCNC: 32 MMOL/L (ref 20–33)
CREAT SERPL-MCNC: 0.54 MG/DL (ref 0.5–1.4)
EOSINOPHIL # BLD AUTO: 0.02 K/UL (ref 0–0.51)
EOSINOPHIL NFR BLD: 0.2 % (ref 0–6.9)
ERYTHROCYTE [DISTWIDTH] IN BLOOD BY AUTOMATED COUNT: 56 FL (ref 35.9–50)
GFR SERPLBLD CREATININE-BSD FMLA CKD-EPI: 90 ML/MIN/1.73 M 2
GLUCOSE SERPL-MCNC: 79 MG/DL (ref 65–99)
HCT VFR BLD AUTO: 49 % (ref 37–47)
HGB BLD-MCNC: 15.6 G/DL (ref 12–16)
IMM GRANULOCYTES # BLD AUTO: 0.07 K/UL (ref 0–0.11)
IMM GRANULOCYTES NFR BLD AUTO: 0.7 % (ref 0–0.9)
LYMPHOCYTES # BLD AUTO: 0.93 K/UL (ref 1–4.8)
LYMPHOCYTES NFR BLD: 8.7 % (ref 22–41)
MCH RBC QN AUTO: 31.4 PG (ref 27–33)
MCHC RBC AUTO-ENTMCNC: 31.8 G/DL (ref 32.2–35.5)
MCV RBC AUTO: 98.6 FL (ref 81.4–97.8)
MONOCYTES # BLD AUTO: 0.81 K/UL (ref 0–0.85)
MONOCYTES NFR BLD AUTO: 7.6 % (ref 0–13.4)
NEUTROPHILS # BLD AUTO: 8.82 K/UL (ref 1.82–7.42)
NEUTROPHILS NFR BLD: 82.4 % (ref 44–72)
NRBC # BLD AUTO: 0 K/UL
NRBC BLD-RTO: 0 /100 WBC (ref 0–0.2)
PLATELET # BLD AUTO: 219 K/UL (ref 164–446)
PMV BLD AUTO: 10.5 FL (ref 9–12.9)
POTASSIUM SERPL-SCNC: 4.6 MMOL/L (ref 3.6–5.5)
RBC # BLD AUTO: 4.97 M/UL (ref 4.2–5.4)
SODIUM SERPL-SCNC: 142 MMOL/L (ref 135–145)
WBC # BLD AUTO: 10.7 K/UL (ref 4.8–10.8)

## 2024-03-25 PROCEDURE — 700102 HCHG RX REV CODE 250 W/ 637 OVERRIDE(OP): Performed by: STUDENT IN AN ORGANIZED HEALTH CARE EDUCATION/TRAINING PROGRAM

## 2024-03-25 PROCEDURE — 94669 MECHANICAL CHEST WALL OSCILL: CPT

## 2024-03-25 PROCEDURE — 700101 HCHG RX REV CODE 250

## 2024-03-25 PROCEDURE — 94640 AIRWAY INHALATION TREATMENT: CPT

## 2024-03-25 PROCEDURE — 97535 SELF CARE MNGMENT TRAINING: CPT

## 2024-03-25 PROCEDURE — 700102 HCHG RX REV CODE 250 W/ 637 OVERRIDE(OP)

## 2024-03-25 PROCEDURE — 85025 COMPLETE CBC W/AUTO DIFF WBC: CPT

## 2024-03-25 PROCEDURE — A9270 NON-COVERED ITEM OR SERVICE: HCPCS

## 2024-03-25 PROCEDURE — 80048 BASIC METABOLIC PNL TOTAL CA: CPT

## 2024-03-25 PROCEDURE — 99239 HOSP IP/OBS DSCHRG MGMT >30: CPT | Mod: GC | Performed by: INTERNAL MEDICINE

## 2024-03-25 PROCEDURE — 97162 PT EVAL MOD COMPLEX 30 MIN: CPT

## 2024-03-25 PROCEDURE — A9270 NON-COVERED ITEM OR SERVICE: HCPCS | Performed by: STUDENT IN AN ORGANIZED HEALTH CARE EDUCATION/TRAINING PROGRAM

## 2024-03-25 PROCEDURE — 700111 HCHG RX REV CODE 636 W/ 250 OVERRIDE (IP): Performed by: STUDENT IN AN ORGANIZED HEALTH CARE EDUCATION/TRAINING PROGRAM

## 2024-03-25 PROCEDURE — 97166 OT EVAL MOD COMPLEX 45 MIN: CPT

## 2024-03-25 PROCEDURE — 36415 COLL VENOUS BLD VENIPUNCTURE: CPT

## 2024-03-25 RX ORDER — IPRATROPIUM BROMIDE AND ALBUTEROL SULFATE 2.5; .5 MG/3ML; MG/3ML
3 SOLUTION RESPIRATORY (INHALATION)
Status: DISCONTINUED | OUTPATIENT
Start: 2024-03-25 | End: 2024-03-25 | Stop reason: HOSPADM

## 2024-03-25 RX ORDER — PREDNISONE 20 MG/1
40 TABLET ORAL DAILY
Qty: 4 TABLET | Refills: 0 | Status: SHIPPED | OUTPATIENT
Start: 2024-03-25 | End: 2024-03-27

## 2024-03-25 RX ADMIN — BRIMONIDINE TARTRATE 1 DROP: 2 SOLUTION OPHTHALMIC at 17:21

## 2024-03-25 RX ADMIN — DORZOLAMIDE HYDROCHLORIDE AND TIMOLOL MALEATE 1 DROP: 20; 5 SOLUTION/ DROPS OPHTHALMIC at 04:07

## 2024-03-25 RX ADMIN — MOMETASONE FUROATE AND FORMOTEROL FUMARATE DIHYDRATE 2 PUFF: 200; 5 AEROSOL RESPIRATORY (INHALATION) at 17:22

## 2024-03-25 RX ADMIN — TIOTROPIUM BROMIDE INHALATION SPRAY 5 MCG: 3.12 SPRAY, METERED RESPIRATORY (INHALATION) at 06:49

## 2024-03-25 RX ADMIN — LISINOPRIL 10 MG: 10 TABLET ORAL at 04:06

## 2024-03-25 RX ADMIN — MOMETASONE FUROATE AND FORMOTEROL FUMARATE DIHYDRATE 2 PUFF: 200; 5 AEROSOL RESPIRATORY (INHALATION) at 06:49

## 2024-03-25 RX ADMIN — IPRATROPIUM BROMIDE AND ALBUTEROL SULFATE 3 ML: 2.5; .5 SOLUTION RESPIRATORY (INHALATION) at 06:49

## 2024-03-25 RX ADMIN — DORZOLAMIDE HYDROCHLORIDE AND TIMOLOL MALEATE 1 DROP: 20; 5 SOLUTION/ DROPS OPHTHALMIC at 17:22

## 2024-03-25 RX ADMIN — BRIMONIDINE TARTRATE 1 DROP: 2 SOLUTION OPHTHALMIC at 04:13

## 2024-03-25 RX ADMIN — IPRATROPIUM BROMIDE AND ALBUTEROL SULFATE 3 ML: 2.5; .5 SOLUTION RESPIRATORY (INHALATION) at 10:46

## 2024-03-25 RX ADMIN — PREDNISONE 40 MG: 20 TABLET ORAL at 04:06

## 2024-03-25 RX ADMIN — LATANOPROST 1 DROP: 50 SOLUTION OPHTHALMIC at 17:21

## 2024-03-25 ASSESSMENT — GAIT ASSESSMENTS
GAIT LEVEL OF ASSIST: CONTACT GUARD ASSIST
DEVIATION: DECREASED BASE OF SUPPORT;BRADYKINETIC;OTHER (COMMENT)
DISTANCE (FEET): 15

## 2024-03-25 ASSESSMENT — COGNITIVE AND FUNCTIONAL STATUS - GENERAL
HELP NEEDED FOR BATHING: A LOT
WALKING IN HOSPITAL ROOM: A LITTLE
DAILY ACTIVITIY SCORE: 21
MOBILITY SCORE: 18
SUGGESTED CMS G CODE MODIFIER MOBILITY: CK
SUGGESTED CMS G CODE MODIFIER DAILY ACTIVITY: CJ
STANDING UP FROM CHAIR USING ARMS: A LITTLE
MOVING FROM LYING ON BACK TO SITTING ON SIDE OF FLAT BED: A LITTLE
TOILETING: A LITTLE
MOVING TO AND FROM BED TO CHAIR: A LITTLE
CLIMB 3 TO 5 STEPS WITH RAILING: A LITTLE
TURNING FROM BACK TO SIDE WHILE IN FLAT BAD: A LITTLE

## 2024-03-25 ASSESSMENT — FIBROSIS 4 INDEX: FIB4 SCORE: 2.31

## 2024-03-25 ASSESSMENT — ACTIVITIES OF DAILY LIVING (ADL): TOILETING: INDEPENDENT

## 2024-03-25 NOTE — DOCUMENTATION QUERY
[facility]                                                                       Query Response Note      PATIENT:               KATLIN SAAB  ACCT #:                  [id number]  MRN:                     [id number]  :                      1940  ADMIT DATE:       3/22/2024 8:35 PM  DISCH DATE:          RESPONDING  PROVIDER #:        [id number]           QUERY TEXT:    The wound care consult indicates this patient is being treated for ulcer of the following site:      Pressure Injury Open Incision Sacrum DTI (Deep Tissue Injury) (Active)    Based on your medical judgement, can you please confirm this clinical finding?      The patient's Clinical Indicators include:  - Findings:  Wound care consult 3/23/24:   Pressure Injury Open Incision Sacrum POA DTI (Deep Tissue Injury)(Active)    - Treatments:  wound care consult, offloading, foam cleanser, barrier paste     - Risk factors:  advanced age, malnutrition, fluid overload, COPD    Thank You,  Quynh Gillette RN  Clinical Documentation   [email or web address]  Connect via [email or web address]  Options provided:   -- Pressure Injury Open Incision Sacrum DTI (Deep Tissue Injury) (Active) present on admission   -- Other explanation, (please specify other explanation)      Query created by: Quynh Gillette on 3/25/2024 7:12 AM    RESPONSE TEXT:    Pressure Injury Open Incision Sacrum DTI (Deep Tissue Injury) (Active) present on admission       QUERY TEXT:    Unspecified Malnutrition is documented in the Medical Record.  Per Registered Dietician assessment/evaluation, patient meets ASPEN criteria for severe malnutrition.     Based on your medical judgement, can you please confirm the nutritional status of this patient?    The patient's Clinical Indicators include:  - Findings:  H&P and subsequent progress notes:  Protein calorie malnutrition     - Registered Dietician assessment/evaluation  3/23/24:   Malnutrition Risk: Pt meets criteria for severe malnutrition in the context of a social circumstance related to pulling of teeth and dentures as evidenced by pt reported weight loss of 16-21 lbs (14.5%-18.3%), reported decreased intake (likely </=50%  of estimated needs for >/=1 month) and severe fat loss noted to triceps and severe muscle wasting noted to temporalis.  - Body mass index is 16.27 kg/m²., BMI classification: underweight  - Pt is down 16-21 lbs (14.5%-18.3%)   from reported UBW. This is severe.    - Treatments:  Registered Dietician assessment/evaluation, regular diet, nutritional supplements TID, monitor weight, Document intake of all meals as % taken in ADL's to provide interdisciplinary communication across all shifts.    - Risk factors:  fluid overload, COPD, advanced age, acute on chronic respiratory failure, unintentional weight loss, muscle wasting, pressure ulcer     Thank You,  Quynh Gillette RN  Clinical Documentation   [email or web address]  Connect via [employer]  Options provided:   -- Mild protein calorie malnutrition   -- Moderate protein calorie malnutrition   -- Severe protein calorie malnutrition   -- Other explanation, (please specify other explanation)      Query created by: Quynh Gillette on 3/25/2024 7:33 AM    RESPONSE TEXT:    Severe protein calorie malnutrition          Electronically signed by:  PARADISE MANUEL MD 3/25/2024 8:24 AM

## 2024-03-25 NOTE — THERAPY
Distant supervision and agree with below note.    Paulette Aly, PT, DPT    Physical Therapy   Initial Evaluation     Patient Name: Yesenia Dove  Age:  83 y.o., Sex:  female  Medical Record #: [id number]  Today's Date: 3/25/2024     Precautions  Precautions: Fall Risk  Comments: bradycardia    Assessment  Patient is 83 y.o. female with a diagnosis of bradycardia.  Additional factors influencing patient status / progress including elevated brain natriuretic peptide level, acute hypoxic resp failure, T wave inversion in EKG, protein calorie malnutrition, and COPD.    Pt was agreeable to the evaluation session detailed below. Pt has a good support system at home and states she will live w/ her son and daughter-in-law for a few days upon d/c. Pt receptive to education about use of an AD, activity pacing, and importance of daily mobility. Pt appears to be functioning below her baseline and would benefit from home health services to improve safety at home w/ continued support from son who lives nearby. Will continue to follow during admit.       Plan    Physical Therapy Initial Treatment Plan   Treatment Plan : Bed Mobility, Equipment, Gait Training, Neuro Re-Education / Balance, Stair Training, Therapeutic Activities, Therapeutic Exercise  Treatment Frequency: 3 Times per Week  Duration: Until Therapy Goals Met    DC Equipment Recommendations:  (possibly a FWW)  Discharge Recommendations: Recommend home health for continued physical therapy services (w/ continued support from son)     Objective       03/25/24 0848   Initial Contact Note    Initial Contact Note Order Received and Verified, Physical Therapy Evaluation in Progress with Full Report to Follow.   Precautions   Precautions Fall Risk;Weight Bearing As Tolerated Right Upper Extremity;Other (See Comments)   Comments bradycardia; SpO2 goal: 88-92%   Vitals   Pulse (!) 51   Patient BP Position Bennett's Position   Blood Pressure  121/79   Pulse Oximetry 94 %   O2  (LPM) 2   O2 Delivery Device Silicone Nasal Cannula   Vitals Comments Inconsistent SpO2 reading during ambulation, but pt desated to low 80's w/ O2 increase to 6L, following ambulation in sitting, SpO2: 89% and following 2-3 minutes of sitting: SpO2: 92%   Pain   Pain Scales 0 to 10 Scale    Intervention Declines   Pain 0 - 10 Group   Therapist Pain Assessment Post Activity Pain Same as Prior to Activity;0   Prior Living Situation   Prior Services None   Housing / Facility 1 Story Apartment / Condo   Steps Into Home 2   Steps In Home 0   Rail Left Rail  (Steps into Home)   Equipment Owned None   Lives with - Patient's Self Care Capacity Alone and Able to Care For Self   Comments Pt lives very close to son and dtr in law, pt states her front door to their front door is about 40ft that her son always walks w/ her; pt's son and dtr in law check on her frequently and have dinner w/ her every night; pt states she will stay in their residence w/ them for a few days following d/c and they will be home 24/7 to assist PRN   Prior Level of Functional Mobility   Bed Mobility Independent   Transfer Status Independent   Ambulation Independent   Ambulation Distance long househol   Assistive Devices Used None   Stairs Independent   Comments pt states she relies on UE support during ambulation and furniture surfs; pt states she has not been incontinent at home   History of Falls   History of Falls Yes   Date of Last Fall   (pt experienced one fall a few months ago due to a dog jumping on her)   Cognition    Cognition / Consciousness X   Level of Consciousness Alert   Safety Awareness Impaired   Comments pt very pleasant and cooperative; receptive to education but may have impaired insight into her current capabilities   Passive ROM Lower Body   Passive ROM Lower Body WDL   Active ROM Lower Body    Active ROM Lower Body  WDL   Strength Lower Body   Lower Body Strength  X   Comments Grossly BLE: 4-/5, except B hip flexion: 3+/5    Sensation Lower Body   Lower Extremity Sensation   WDL   Comments no c/o numbness or tingling   Coordination Lower Body    Coordination Lower Body  WDL   Other Treatments   Other Treatments Provided discussion about d/c options, pt open to  services; discussion about use of AD instead of UE support on surrounding furniture and the safety concern w/ this technique; education provided on importance of OOB activity including daily mobility; assistance w/ sock doff and don following urinary incontinence episode; education provided on pursed lip breathing and activity pacing   Balance Assessment   Sitting Balance (Static) Fair +   Sitting Balance (Dynamic) Fair   Standing Balance (Static) Fair   Standing Balance (Dynamic) Fair -   Weight Shift Sitting Good   Weight Shift Standing Fair   Comments seated at EOB, standing w/o an AD   Bed Mobility    Supine to Sit Standby Assist   Sit to Supine   (left up in chair)   Scooting Supervised   Comments HOB flat   Gait Analysis   Gait Level Of Assist Contact Guard Assist   Assistive Device None   Distance (Feet) 15   # of Times Distance was Traveled 2  (1 standing rest break)   Deviation Decreased Base Of Support;Bradykinetic;Other (Comment)  (reliance on UE support from nearby objects; inconsistant B step & stride length)   Comments slight unsteadiness during ambulation w/ reliance on UE for support, no appearance of SOB   Functional Mobility   Sit to Stand Standby Assist   Bed, Chair, Wheelchair Transfer Standby Assist   Transfer Method Stand Step   Mobility bed>room ambulation>bed>room ambulation>bed>chair   Comments STS x3 times from EOB   6 Clicks Assessment - How much HELP from from another person do you currently need... (If the patient hasn't done an activity recently, how much help from another person do you think he/she would need if he/she tried?)   Turning from your back to your side while in a flat bed without using bedrails? 3   Moving from lying on your back to  sitting on the side of a flat bed without using bedrails? 3   Moving to and from a bed to a chair (including a wheelchair)? 3   Standing up from a chair using your arms (e.g., wheelchair, or bedside chair)? 3   Walking in hospital room? 3   Climbing 3-5 steps with a railing? 3   6 clicks Mobility Score 18   Activity Tolerance   Sitting in Chair post session   Sitting Edge of Bed 20 minutes   Standing 10 minutes   Patient / Family Goals    Patient / Family Goal #1 return home   Short Term Goals    Short Term Goal # 1 pt will perform supine<>sit task w/ HOB flat and supervision in 6 visits to promote OOB activity   Short Term Goal # 2 pt will perform bed>chair task w/ supervision in 6 visits to promote OOB activity   Short Term Goal # 3 pt will ambulate 40ft w/ LRAD and SBA in 6 visits for household ambulation   Short Term Goal # 4 pt will negotiate 2 stairs w/ CGA in 6 visits for safe entry/exit of residence   Education Group   Education Provided Role of Physical Therapist   Role of Physical Therapist Patient Response Patient;Eager;Explanation;Verbal Demonstration   Physical Therapy Initial Treatment Plan    Treatment Plan  Bed Mobility;Equipment;Gait Training;Neuro Re-Education / Balance;Stair Training;Therapeutic Activities;Therapeutic Exercise   Treatment Frequency 3 Times per Week   Duration Until Therapy Goals Met   Problem List    Problems Impaired Bed Mobility;Impaired Transfers;Impaired Ambulation;Functional Strength Deficit;Impaired Balance;Decreased Activity Tolerance;Safety Awareness Deficits / Cognition   Anticipated Discharge Equipment and Recommendations   DC Equipment Recommendations Unable to determine at this time  (possibly a FWW)   Discharge Recommendations Recommend home health for continued physical therapy services  (w/ continued support from son)   Interdisciplinary Plan of Care Collaboration   IDT Collaboration with  Nursing   Patient Position at End of Therapy Seated;Chair Alarm On;Call Light  within Reach;Phone within Reach;Tray Table within Reach   Collaboration Comments RN notified and present during part of session for SpO2 reading during ambulation   Session Information   Date / Session Number  3/25- 1(1/3, 3/31)

## 2024-03-25 NOTE — THERAPY
"Occupational Therapy   Initial Evaluation     Patient Name: Yesenia Dove  Age:  83 y.o., Sex:  female  Medical Record #: [id number]  Today's Date: 3/25/2024     Precautions  Precautions: (P) Fall Risk  Comments: (P) bradycardia    Assessment   Patient is 83 y.o. female with a diagnosis of bradycardia.  Additional factors influencing patient status / progress including elevated brain natriuretic peptide level, acute hypoxic resp failure, T wave inversion in EKG, protein calorie malnutrition, and COPD.   Pt seen for OT eval. Pt performing functional mobility supervised, requires cues throughout for safety and will be discharging home with O2 so discussed safety with O2 tubing and mobility and ADLs. Pt has good family support, will recommend HHOT. Pt will continue to benefit from inpt OT.     Plan    Occupational Therapy Initial Treatment Plan   Treatment Interventions: (P) Self Care / Activities of Daily Living, Therapeutic Activity, Therapeutic Exercises  Treatment Frequency: (P) 3 Times per Week  Duration: (P) Until Therapy Goals Met    DC Equipment Recommendations: (P) Tub / Shower Seat  Discharge Recommendations: (P) Recommend home health for continued occupational therapy services     Subjective    \"I feel ready to go home\"      Objective       03/25/24 1039   Prior Living Situation   Prior Services None   Housing / Facility 1 Story Apartment / Condo   Steps Into Home 2   Steps In Home 0   Rail Left Rail  (Steps into Home)   Equipment Owned None   Lives with - Patient's Self Care Capacity Alone and Able to Care For Self   Comments lives on same property as son, daughter. Pt reports she will live with them initially upon dc. able to provide 24/7 assist as needed   Prior Level of ADL Function   Self Feeding Independent   Grooming / Hygiene Independent   Bathing Independent   Dressing Independent   Toileting Independent   Comments pt reports she completes sponge baths only   Prior Level of IADL Function "   Medication Management Independent   Laundry Independent   Kitchen Mobility Independent   Finances Independent   Home Management Requires Assist   Shopping Requires Assist   Prior Level Of Mobility Independent Without Device in Community   History of Falls   History of Falls Yes   Date of Last Fall   (1 fall in september, dog tripping her and resulted in R humerus fx)   Precautions   Precautions Fall Risk   Comments bradycardia   Vitals   Pulse 61   Pulse Oximetry 92 %   O2 (LPM) 2   O2 Delivery Device Silicone Nasal Cannula   Pain   Intervention Declines   Pain 0 - 10 Group   Therapist Pain Assessment Post Activity Pain Same as Prior to Activity;Nurse Notified;0   Cognition    Cognition / Consciousness X   Level of Consciousness Alert   Safety Awareness Impaired   Comments diminished insight into sxs   Passive ROM Upper Body   Passive ROM Upper Body WDL   Active ROM Upper Body   Active ROM Upper Body  WDL   Dominant Hand Right   Strength Upper Body   Upper Body Strength  WDL   Comments grossly 4/5, generalized weakness   Sensation Upper Body   Upper Extremity Sensation  WDL   Upper Body Muscle Tone   Upper Body Muscle Tone  WDL   Neurological Concerns   Neurological Concerns No   Coordination Upper Body   Coordination WDL   Balance Assessment   Sitting Balance (Static) Fair +   Sitting Balance (Dynamic) Fair   Standing Balance (Static) Fair   Standing Balance (Dynamic) Fair -   Weight Shift Sitting Good   Weight Shift Standing Fair   Comments no AD   Bed Mobility    Comments was up in chair   ADL Assessment   Eating Independent   Grooming Supervision;Standing   Upper Body Dressing Minimal Assist   Lower Body Dressing Standby Assist   Toileting Standby Assist  (using purewick. pt reports she is continent, using due to lasix)   Functional Mobility   Sit to Stand Standby Assist   Bed, Chair, Wheelchair Transfer Standby Assist   Toilet Transfers Contact Guard Assist   Transfer Method Stand Step   Mobility bed> in rm>  bathroom   Comments no AD   Visual Perception   Visual Perception  WDL   Edema / Skin Assessment   Edema / Skin  X   Comments small skin tear L forearm   Activity Tolerance   Sitting in Chair post session   Standing 5 min   Patient / Family Goals   Patient / Family Goal #1 to get home   Short Term Goals   Short Term Goal # 1 Pt will perform functional transfers supervised   Short Term Goal # 2 Pt will perform UB/LB dressing supervised   Short Term Goal # 3 Pt will perform toileting supervised   Education Group   Education Provided Energy Conservation;Role of Occupational Therapist;Activities of Daily Living   Role of Occupational Therapist Patient Response Patient;Acceptance;Demonstration;Verbal Demonstration;Action Demonstration   Energy Conservation Patient Response Patient;Acceptance;Demonstration;Verbal Demonstration;Action Demonstration   ADL Patient Response Patient;Acceptance;Explanation;Demonstration;Verbal Demonstration;Action Demonstration   Occupational Therapy Initial Treatment Plan    Treatment Interventions Self Care / Activities of Daily Living;Therapeutic Activity;Therapeutic Exercises   Treatment Frequency 3 Times per Week   Duration Until Therapy Goals Met   Problem List   Problem List Decreased Active Daily Living Skills;Decreased Functional Mobility;Decreased Activity Tolerance;Decreased Upper Extremity Strength Right;Safety Awareness Deficits / Cognition   Anticipated Discharge Equipment and Recommendations   DC Equipment Recommendations Tub / Shower Seat   Discharge Recommendations Recommend home health for continued occupational therapy services   Interdisciplinary Plan of Care Collaboration   IDT Collaboration with  Nursing   Patient Position at End of Therapy Seated;Chair Alarm On;Tray Table within Reach;Call Light within Reach   Collaboration Comments RN updated   Session Information   Date / Session Number  3/25, #1 (1/3, 3/31)

## 2024-03-25 NOTE — PROGRESS NOTES
Right proximal humerus fractures is old, seen on imaging in October 2023. Was seen by the [facility] at that time, and they recommended initial restrictions (NWB x 2 weeks, then 5# weight limit x 6 weeks) but stated no restrictions at all after 6 weeks from injury, which we are well past. Therefore, WBAT on R shoulder per previous ortho recs regarding this old injury.

## 2024-03-25 NOTE — DISCHARGE PLANNING
Case Management Discharge Planning    Admission Date: 3/22/2024  GMLOS: 3.5  ALOS: 2    6-Clicks ADL Score: 11  6-Clicks Mobility Score: 17  PT and/or OT Eval ordered: Yes  Post-acute Referrals Ordered: Yes  Post-acute Choice Obtained: Yes  Has referral(s) been sent to post-acute provider:  Yes      Anticipated Discharge Dispo: Discharge Disposition: Discharged to home/self care (01)  Discharge Address: Home ([place], [place])  Discharge Contact Phone Number: Flako Erickson ()    DME Needed: Yes    DME Ordered: Yes    Action(s) Taken: LSW made phone calls to various [facility] agencies and confirmed there is no HHC that goes to [place], [place]. LSW met with pt at bedside to discuss DC and obtain DME choice. Pt reported she's been on service with [facility] in the past and requested the referral be sent to 1) [facility] 2) [facility]. Pt's physical home address is [address] [place] [id number].    LSW informed pt there is no HHC that goes to [place]. Pt voiced understanding and reported she is able to do outpatient therapy at [facility] where her son and dtr work. LSW verified that [facility] offers outpatient therapy. Care team notified.    DME choice form faxed to [facility] and referral sent to [facility].    Escalations Completed: None    Medically Clear: Yes    Next Steps: Care coordination will f/u with DME referral    Barriers to Discharge: Oxygen Delivery    Is the patient up for discharge tomorrow: No-anticipated to DC home today

## 2024-03-25 NOTE — DISCHARGE SUMMARY
[facility] Internal Medicine Discharge Summary    Attending: Angela Beckford M.d.  Senior Resident: Dr. Pena  Intern:  Dr. Jaffe  Contact Number: [phone]    CHIEF COMPLAINT ON ADMISSION  No chief complaint on file.      Reason for Admission  CHF and COPD exacerbation     Admission Date  3/22/2024    CODE STATUS  Full Code    HPI & HOSPITAL COURSE  This is a 83 y.o. female here with Acute hypoxic respiratory failure     83-year-old female with history of COPD not on baseline oxygen at home who presented on 3/22 as a transfer from outside facility for acute on chronic hypoxic respiratory failure. Patient had been having progressively worsening shortness of breath with associated productive cough for over 1 week. She was having to use her albuterol inhaler more frequently. She was treated with azithromycin and Solu-Medrol at the outside facility and was found to have an elevated BNP concerning for volume overload at that time. The outside facility did not have echo capabilities, thus she was transferred here. BNP here was 14,872. Chest x-ray notable for trace bilateral pleural effusions and pulmonary edema. She was initially needing 3 L of oxygen to maintain saturations. Echo was ordered for further evaluation, this was largely unremarkable showing EF 60%, mild to moderate AI, biatrial dilation. Appropriate COPD exacerbation therapy was initiated with 5d prednisone and azithromycin. She also received one dose of IV lasix 20mg and her BNP improved. Her EKG was notable for T-wave inversions however these have been present on EKG since 2021 and she has not had chest pain, palpitations, worsening shortness of breath, nausea, etc. She was feeling improved and we were able to wean her to 2-3L oxygen. Home oxygen orders were placed and discharge home with outpatient PT follow-up was arranged. Home health not available near where she lives.     Therefore, she is discharged in good and stable condition to home with organized home  healthcare and close outpatient follow-up.    The patient met 2-midnight criteria for an inpatient stay at the time of discharge.    Discharge Date  3/25/2024    Physical Exam on Day of Discharge  Physical Exam  Constitutional:       General: She is not in acute distress.     Appearance: Normal appearance.   HENT:      Head: Normocephalic and atraumatic.      Mouth/Throat:      Mouth: Mucous membranes are moist.   Eyes:      Extraocular Movements: Extraocular movements intact.      Pupils: Pupils are equal, round, and reactive to light.   Cardiovascular:      Rate and Rhythm: Normal rate and regular rhythm.      Heart sounds: No murmur heard.  Pulmonary:      Effort: Pulmonary effort is normal. No respiratory distress.      Breath sounds: No wheezing or rhonchi.   Abdominal:      General: There is no distension.      Palpations: Abdomen is soft.   Musculoskeletal:      Right lower leg: No edema.      Left lower leg: No edema.   Skin:     General: Skin is warm and dry.      Capillary Refill: Capillary refill takes less than 2 seconds.   Neurological:      General: No focal deficit present.      Mental Status: She is alert and oriented to person, place, and time.   Psychiatric:         Mood and Affect: Mood normal.         Behavior: Behavior normal.         Thought Content: Thought content normal.         Judgment: Judgment normal.         FOLLOW UP ITEMS POST DISCHARGE  Follow-up outpatient physical therapy.     DISCHARGE DIAGNOSES  Principal Problem:    Bradycardia (POA: Unknown)  Active Problems:    Acute hypoxic respiratory failure (HCC) (POA: Unknown)    COPD exacerbation (HCC) (POA: Unknown)    Elevated brain natriuretic peptide (BNP) level (POA: Yes)    Protein calorie malnutrition (HCC) (POA: Unknown)    Acute on chronic respiratory failure (HCC) (POA: Yes)    T wave inversion in EKG (POA: Unknown)  Resolved Problems:    * No resolved hospital problems. *      FOLLOW UP  No future appointments.  No follow-up  provider specified.    MEDICATIONS ON DISCHARGE     Medication List        START taking these medications        Instructions   predniSONE 20 MG Tabs  Commonly known as: Deltasone   Take 2 Tablets by mouth every day for 2 days.  Dose: 40 mg            CONTINUE taking these medications        Instructions   Alphagan P 0.1 % Soln  Generic drug: brimonidine   Administer 1 Drop into both eyes 2 times a day.  Dose: 1 Drop     dorzolamide-timolol 2-0.5 % Soln  Commonly known as: Cosopt      latanoprost 0.005 % Soln  Commonly known as: Xalatan      lisinopril 10 MG Tabs  Commonly known as: Prinivil   Take 10 mg by mouth every day.  Dose: 10 mg     Spiriva Respimat 2.5 MCG/ACT Aers  Generic drug: tiotropium      Wixela Inhub 250-50 MCG/ACT Aepb  Generic drug: fluticasone-salmeterol               Allergies  No Known Allergies    DIET  Orders Placed This Encounter   Procedures    Diet Order Diet: Regular     Standing Status:   Standing     Number of Occurrences:   1     Order Specific Question:   Diet:     Answer:   Regular [1]       ACTIVITY  As tolerated.  Weight bearing as tolerated    CONSULTATIONS  NA    PROCEDURES  NA    LABORATORY  Lab Results   Component Value Date    SODIUM 142 03/25/2024    POTASSIUM 4.6 03/25/2024    CHLORIDE 103 03/25/2024    CO2 32 03/25/2024    GLUCOSE 79 03/25/2024    BUN 41 (H) 03/25/2024    CREATININE 0.54 03/25/2024        Lab Results   Component Value Date    WBC 10.7 03/25/2024    HEMOGLOBIN 15.6 03/25/2024    HEMATOCRIT 49.0 (H) 03/25/2024    PLATELETCT 219 03/25/2024        Total time of the discharge process exceeds 40 minutes.

## 2024-03-25 NOTE — CARE PLAN
The patient is Stable - Low risk of patient condition declining or worsening    Shift Goals  Clinical Goals: Administer ABX, rest, monitor  02 level  Patient Goals: Rest  Family Goals: shania    Progress made toward(s) clinical / shift goals:  did home o2 eval and got PT eval for HH,       Problem: Knowledge Deficit - Standard  Goal: Patient and family/care givers will demonstrate understanding of plan of care, disease process/condition, diagnostic tests and medications  Outcome: Progressing  Note: Pt educated on current POC, expected outcomes and goals, and new medications ordered. All questions and concerns have been answered at this time. MD educated pt on disease pathology and work up. Home O2 eval this morning and PT/OT evals, possible DC today?     Problem: Skin Integrity  Goal: Skin integrity is maintained or improved  Outcome: Progressing  Note: Pt's skin assessed, mepilex, JESU, and taps for support of turning, Offloading, Q2 hour turns in place and discussed with interdisciplinary team.

## 2024-03-25 NOTE — FACE TO FACE
"Face to Face Note  -  Durable Medical Equipment    Pastor Jaffe M.D. - NPI: [id number]  I certify that this patient is under my care and that they had a durable medical equipment(DME)face to face encounter by myself that meets the physician DME face-to-face encounter requirements with this patient on:    Date of encounter:   Patient:                    MRN:                       YOB: 2026  Yesenia Dove  [id number]  1940     The encounter with the patient was in whole, or in part, for the following medical condition, which is the primary reason for durable medical equipment:  COPD    I certify that, based on my findings, the following durable medical equipment is medically necessary:    Oxygen   HOME O2 Saturation Measurements:(Values must be present for Home Oxygen orders)  Room air sat at rest: 81  Room air sat with amb: 77  With liters of O2: 2, O2 sat at rest with O2: 92  With Liters of O2: 6, O2 sat with amb with O2 : 91  Is the patient mobile?: Yes  If patient feels more short of breath, they can go up to 6 liters per minute and contact healthcare provider.    Supporting Symptoms: The patient requires supplemental oxygen, as the following interventions have been tried with limited or no improvement: \"Bronchodilators and/or steroid inhalers, \"Oral and/or IV steroids, \"Ambulation with oximetry, and \"Incentive spirometry.    My Clinical findings support the need for the above equipment due to:  Hypoxia, Frequent Falls  "

## 2024-03-25 NOTE — DISCHARGE PLANNING
Agency/Facility Name: [facility]   Spoke To: Zina  Outcome: DPA was provided with ETA of two hours , referral has been received .     DPA provided PT IMM ,saved in .    DME referral sent to [facility] .

## 2024-03-25 NOTE — CARE PLAN
The patient is Stable - Low risk of patient condition declining or worsening    Shift Goals  Clinical Goals: Administer eye medications, rest, monitor  02 level  Patient Goals: Rest  Family Goals: shania    Progress made toward(s) clinical / shift goals:        Problem: Knowledge Deficit - Standard  Goal: Patient and family/care givers will demonstrate understanding of plan of care, disease process/condition, diagnostic tests and medications  Outcome: Progressing  Note: Discussed s/sx of dry mouth. Mouth moisture provided.      Problem: Impaired Gas Exchange  Goal: Patient will demonstrate improved ventilation and adequate oxygenation and participate in treatment regimen within the level of ability/situation.  Outcome: Progressing  Note: Pt continues to use 1.5 L NC with good effect. No s/sx of respiratory distress.      Problem: Fall Risk  Goal: Patient will remain free from falls  Outcome: Progressing  Note: Pt remained safe this shift with fall precautions in place, call light within reach, Pt uses call light appropriately.      Problem: Skin Integrity  Goal: Skin integrity is maintained or improved  Note: Pt skin paper thin extremely fragile. Repositioning, pillows used to off load bony areas.

## 2024-03-25 NOTE — DISCHARGE INSTRUCTIONS
Discharge Instructions    Discharged to home by car with relative. Discharged via wheelchair, hospital escort: Yes.  Special equipment needed: Oxygen    Be sure to schedule a follow-up appointment with your primary care doctor or any specialists as instructed.     Discharge Plan:   Diet Plan: Discussed  Activity Level: Discussed  Confirmed Follow up Appointment: Patient to Call and Schedule Appointment  Confirmed Symptoms Management: Discussed  Medication Reconciliation Updated: Yes  Influenza Vaccine Indication: Indicated: 65 years and older    I understand that a diet low in cholesterol, fat, and sodium is recommended for good health. Unless I have been given specific instructions below for another diet, I accept this instruction as my diet prescription.   Other diet: regular    Special Instructions: None    -Is this patient being discharged with medication to prevent blood clots?  No    Is patient discharged on Warfarin / Coumadin?   No     COPD and Physical Activity  Chronic obstructive pulmonary disease (COPD) is a long-term, or chronic, condition that affects the lungs. COPD is a general term that can be used to describe many problems that cause inflammation of the lungs and limit airflow. These conditions include chronic bronchitis and emphysema.  The main symptom of COPD is shortness of breath, which makes it harder to do even simple tasks. This can also make it harder to exercise and stay active. Talk with your health care provider about treatments to help you breathe better and actions you can take to prevent breathing problems during physical activity.  What are the benefits of exercising when you have COPD?  Exercising regularly is an important part of a healthy lifestyle. You can still exercise and do physical activities even though you have COPD. Exercise and physical activity improve your shortness of breath by increasing blood flow (circulation). This causes your heart to pump more oxygen through  your body. Moderate exercise can:  Improve oxygen use.  Increase your energy level.  Help with shortness of breath.  Strengthen your breathing muscles.  Improve heart health.  Help with sleep.  Improve your self-esteem and feelings of self-worth.  Lower depression, stress, and anxiety.  Exercise can benefit everyone with COPD. The severity of your disease may affect how hard you can exercise, especially at first, but everyone can benefit. Talk with your health care provider about how much exercise is safe for you, and which activities and exercises are safe for you.  What actions can I take to prevent breathing problems during physical activity?  Sign up for a pulmonary rehabilitation program. This type of program may include:  Education about lung diseases.  Exercise classes that teach you how to exercise and be more active while improving your breathing. This usually involves:  Exercise using your lower extremities, such as a stationary bicycle.  About 30 minutes of exercise, 2 to 5 times per week, for 6 to 12 weeks.  Strength training, such as push-ups or leg lifts.  Nutrition education.  Group classes in which you can talk with others who also have COPD and learn ways to manage stress.  If you use an oxygen tank, you should use it while you exercise. Work with your health care provider to adjust your oxygen for your physical activity. Your resting flow rate is different from your flow rate during physical activity.  How to manage your breathing while exercising  While you are exercising:  Take slow breaths.  Pace yourself, and do nottry to go too fast.  Purse your lips while breathing out. Pursing your lips is similar to a kissing or whistling position.  If doing exercise that uses a quick burst of effort, such as weight lifting:  Breathe in before starting the exercise.  Breathe out during the hardest part of the exercise, such as raising the weights.  Where to find support  You can find support for exercising  with COPD from:  Your health care provider.  A pulmonary rehabilitation program.  Your local health department or community health programs.  Support groups, either online or in-person. Your health care provider may be able to recommend support groups.  Where to find more information  You can find more information about exercising with COPD from:  American Lung Association: lung.org  COPD Foundation: copdfoundation.org  Contact a health care provider if:  Your symptoms get worse.  You have nausea.  You have a fever.  You want to start a new exercise program or a new activity.  Get help right away if:  You have chest pain.  You cannot breathe.  These symptoms may represent a serious problem that is an emergency. Do not wait to see if the symptoms will go away. Get medical help right away. Call your local emergency services (911 in the [place] Do not drive yourself to the hospital.  Summary  COPD is a general term that can be used to describe many different lung problems that cause lung inflammation and limit airflow. This includes chronic bronchitis and emphysema.  Exercise and physical activity improve your shortness of breath by increasing blood flow (circulation). This causes your heart to provide more oxygen to your body.  Contact your health care provider before starting any exercise program or new activity. Ask your health care provider what exercises and activities are safe for you.  This information is not intended to replace advice given to you by your health care provider. Make sure you discuss any questions you have with your health care provider.  Document Revised: 10/26/2021 Document Reviewed: 10/26/2021  [employer] Patient Education © 2023 [employer]    Home Oxygen Use, Adult  When a medical condition keeps you from getting enough oxygen, your health care provider may instruct you to take extra oxygen at home. Your health care provider will let you know:  When to take oxygen.  How long to take  oxygen.  How quickly oxygen should be delivered (flow rate), in liters per minute (LPM or L/M).  Home oxygen can be given through:  A mask.  A nasal cannula. This is a device or tube that goes in the nostrils.  A transtracheal catheter. This is a small, thin tube placed in the windpipe (trachea).  A breathing tube (tracheostomy tube) that is surgically placed in the windpipe. This may be used in severe cases.  These devices are connected with tubing to an oxygen source, such as:  A tank. Tanks hold oxygen in gas form. They must be replaced when the oxygen is used up.  A liquid oxygen device. This holds oxygen in liquid form. Liquid oxygen is very cold. It must be replaced when the oxygen is used up.  An oxygen concentrator machine. This filters oxygen in the room. There are two types of oxygen concentrator machines--stationary and portable.  A stationary oxygen concentrator machine plugs into the main electricity supply at your home. You must have a backup cylinder of oxygen in case the power goes out.  A portable oxygen concentrator machine is smaller in size and more lightweight. This machine uses battery supply and can be used outside the home.  Work with your health care provider to find equipment that works best for you and your lifestyle.  What are the risks?  Delivery of supplemental oxygen is generally safe. However, some risks include:  Fire. This can happen if the oxygen is exposed to a heat source, flame, or spark.  Injury to skin. This can happen if liquid oxygen touches your skin.  Damage to the lungs or other organs. This can happen from getting too little or too much oxygen.  Supplies needed:  To use oxygen, you will need:  A mask, nasal cannula, transtracheal catheter, or tracheostomy.  An oxygen tank, a liquid oxygen device, or an oxygen concentrator.  The tape that your health care provider recommends (optional).  Your health care provider may also recommend:  A humidifier to warm and moisten the  oxygen delivered. This will depend on how much oxygen you need and the type of home oxygen device you use.  A pulse oximeter. This device measures the percentage of oxygen in your blood.  How to use oxygen  Your health care provider or a person from your medical device company will show you how to use your oxygen device. Follow his or her instructions. The instructions may look something like this:  Wash your hands with soap and water.  If you use an oxygen concentrator, make sure it is plugged in.  Place one end of the tube into the port on the tank, device, or machine.  Place the mask over your nose and mouth. Or, place the nasal cannula and secure it with tape if instructed. If you use a tracheostomy or transtracheal catheter, connect it to the oxygen source as directed.  Make sure the liter-flow setting on the machine is at the level prescribed by your health care provider.  Turn on the machine or adjust the knob on the tank or device to the correct liter-flow setting.  When you are done, turn off and unplug the machine, or turn the knob to OFF.  How to clean and care for the oxygen supplies  Nasal cannula  Clean it with a warm, wet cloth daily or as needed.  Wash it with a liquid soap once a week.  Rinse it thoroughly once or twice a week.  Air-dry it.  Replace it every 2-4 weeks.  If you have an infection, such as a cold or pneumonia, change the cannula when you get better.  Mask  Replace it every 2-4 weeks.  If you have an infection, such as a cold or pneumonia, change the mask when you get better.  Humidifier bottle  Wash the bottle between each refill:  Wash it with soap and warm water.  Rinse it thoroughly.  Clean it and its top with a disinfectant .  Air-dry it.  Make sure it is dry before you refill it.  Oxygen concentrator  Clean the air filter at least twice a week according to directions from your home medical equipment and service company.  Wipe down the cabinet every day. To do this:  Unplug  "the unit.  Wipe down the cabinet with a damp cloth.  Dry the cabinet.  Other equipment  Change any extra tubing every 1-3 months.  Follow instructions from your health care provider about taking care of any other equipment.  Safety tips  Fire safety tips    Keep your oxygen and oxygen supplies at least 6 ft (2 m) away from sources of heat, flames, and kiran at all times.  Do not allow smoking near your oxygen. Put up \"no smoking\" signs in your home. Avoid smoking areas when in public.  Do not use materials that can burn (are flammable) while you use oxygen. This includes:  Petroleum jelly.  Hair spray or other aerosol sprays.  Rubbing alcohol.  Hand .  When you go to a restaurant with portable oxygen, ask to be seated in the non-smoking section.  Keep a fire extinguisher close by. Let your fire department know that you have oxygen in your home.  Test your home smoke detectors regularly.  Traveling  Secure your oxygen tank in the vehicle so that it does not move around. Follow instructions from your medical device company about how to safely secure your tank.  Make sure you have enough oxygen for the amount of time you will be away from home.  If you are planning to travel by public transportation (airplane, train, bus, or boat), contact the company to find out if it allows the use of an approved portable oxygen concentrator. You may also need documents from your health care provider and medical device company before you travel.  General safety tips  If you use an oxygen cylinder, make sure it is in a stand or secured to an object that will not move (fixed object).  If you use liquid oxygen, make sure its container is kept upright at all times.  If you use an oxygen concentrator:  Tell your electric company. Make sure you are given priority service in the event that your power goes out.  Avoid using extension cords if possible.  Follow these instructions at home:  Use oxygen only as told by your health care " provider.  Do not use alcohol or other drugs that make you relax (sedating drugs) unless instructed. They can slow down your breathing rate and make it hard to get in enough oxygen.  Know how and when to order a refill of oxygen.  Always keep a spare tank of oxygen. Plan ahead for holidays when you may not be able to get a prescription filled.  Use water-based lubricants on your lips or nostrils. Do not use oil-based products like petroleum jelly.  To prevent skin irritation on your cheeks or behind your ears, tuck some gauze under the tubing.  Where to find more information  American Lung Association: www.lung.org/oxygen  Contact a health care provider if:  You get headaches often.  You have a lasting cough.  You are restless or have anxiety.  You develop an illness that affects your breathing.  You cannot exercise at your regular level.  You have a fever.  You have persistent redness under your nose.  Get help right away if:  You are confused.  You are sleepy all the time.  You have blue lips or fingernails.  You have difficult or irregular breathing that is getting worse.  You are struggling to breathe.  These symptoms may represent a serious problem that is an emergency. Do not wait to see if the symptoms will go away. Get medical help right away. Call your local emergency services (911 in the [place] Do not drive yourself to the hospital.  Summary  Your health care provider or a person from your medical device company will show you how to use your oxygen device. Follow his or her instructions.  If you use an oxygen concentrator, make sure it is plugged in.  Make sure the liter-flow setting on the machine is at the level prescribed by your health care provider.  Use oxygen only as told by your health care provider.  Keep your oxygen and oxygen supplies at least 6 ft (2 m) away from sources of heat, flames, and kiran at all times.  This information is not intended to replace advice given to you by your health  care provider. Make sure you discuss any questions you have with your health care provider.  Document Revised: 04/20/2023 Document Reviewed: 12/15/2020  [employer] Patient Education © 2023 [employer]    Pulse Oximetry  Pulse oximetry is a technology that measures the oxygen saturation level in the blood through the skin without the need for a blood sample. This may also be referred to as oxygen level. The device used to measure the oxygen level is called a pulse oximeter. This device also measures the heart rate (pulse). Pulse oximetry helps to assess:  Current oxygen level, including low blood oxygen levels (hypoxemia).  The need for or effectiveness of oxygen therapy or other treatments, including the need for more or less oxygen.  Blood flow (circulation) to different parts of the body.  Oxygen level during activity.  What are the benefits?  Benefits of pulse oximetry include:  Not needing a blood sample to measure the oxygen level.  The test does not hurt.  Having the option to measure oxygen level continuously or as needed.  An alarm to tell you when your oxygen levels are out of range if pulse oximetry is continuous.  What are the risks?  The risks associated with pulse oximetry are rare. However, there is a risk of skin sores if the sensor is left in the same spot for long periods of time.  What happens during the test?  Pulse oximetry is done using a pulse oximeter device with a light sensor attached.  One side of the sensor passes a red beam of light through the skin, and the other side of the sensor measures the amount of light that is absorbed while it passes through. The sensor is connected to the pulse oximeter.  The pulse oximeter uses the information from the sensor to calculate the percentage of blood cells carrying oxygen in the blood.  The sensor is placed on an area of the body where the beam of light can easily pass through the skin.  For adults and children, the sensor is usually a clip placed  on a finger, with the light centered over the nail bed. The sensor may also be placed on an earlobe or toe.  For babies, the sensor is usually a sticky tape strip that is placed around areas such as the sole of a foot or the palm of a hand.  What can I expect after the test?  The pulse oximetry results should be available right away.  If your pulse oximetry results are low, you may need to use oxygen.  The pulse oximetry results are a percentage. The normal value may vary depending on your medical condition.  Most healthy people have oxygen saturation levels between 95% and 100%.  Low oxygen saturation levels are below 90%. This may happen in people with lung conditions, such as long-term (chronic) obstructive pulmonary disease (COPD).  What can affect the accuracy of the oximetry reading?  Pulse oximetry depends on the amount of light absorbed as it passes through skin tissue. Because of this, the accuracy of this measurement can be affected by one or more of the following:  Factors such as:  Dark nail polish or artificial nails.  Very dark skin.  Shivering or too much movement.  Bright, artificial lighting.  Chronic smoking and recent breathing-in (inhalation) of smoke or carbon monoxide.  Conditions such as:  Cool skin or poor blood flow to the area where the sensor is placed.  Sweating or very warm skin in the area where the sensor is placed.  Anemia, or low levels of hemoglobin or red blood cells.  Polycythemia vera. This is a bone marrow disease that causes high levels of red blood cells, white blood cells, and platelets.  If a more accurate measurement is needed, a blood sample will be taken.  Summary  Pulse oximetry uses a device to measure the oxygen level in the blood.  Pulse oximetry does not hurt. The risks associated with pulse oximetry are rare.  Most healthy people have oxygen levels between 95% and 100%. A low oxygen saturation level is below 90%.  People with low oxygen levels may need supplemental  oxygen.  This information is not intended to replace advice given to you by your health care provider. Make sure you discuss any questions you have with your health care provider.  Document Revised: 08/31/2022 Document Reviewed: 03/22/2022  [employer] Patient Education © 2023 [employer]    Prednisolone Tablets  What is this medication?  PREDNISOLONE (pred NISS oh lone) treats many conditions such as asthma, allergic reactions, arthritis, inflammatory bowel diseases, adrenal, and blood or bone marrow disorders. It works by decreasing inflammation, slowing down an overactive immune system, or replacing cortisol normally made in the body. Cortisol is a hormone that plays an important role in how the body responds to stress, illness, and injury. It belongs to a group of medications called steroids.  This medicine may be used for other purposes; ask your health care provider or pharmacist if you have questions.  COMMON BRAND NAME(S): Millipred, Millipred DP, Millipred DP 12-Day, Millipred DP 6 Day, Prednoral  What should I tell my care team before I take this medication?  They need to know if you have any of these conditions:  Cushing's syndrome  Diabetes  Glaucoma  Heart problems or disease  High blood pressure  Infection such as herpes, measles, tuberculosis, or chickenpox  Kidney disease  Liver disease  Mental problems  Myasthenia gravis  Osteoporosis  Seizures  Stomach ulcer or intestine disease including colitis and diverticulitis  Thyroid problem  An unusual or allergic reaction to lactose, prednisolone, other medications, foods, dyes, or preservatives  Pregnant or trying to get pregnant  Breast-feeding  How should I use this medication?  Take this medication by mouth with a glass of water. Follow the directions on the prescription label. Take it with food or milk to avoid stomach upset. If you are taking this medication once a day, take it in the morning. Do not take more medication than you are told to take. Do  not suddenly stop taking your medication because you may develop a severe reaction. Your care team will tell you how much medication to take. If your care team wants you to stop the medication, the dose may be slowly lowered over time to avoid any side effects.  Talk to your care team about the use of this medication in children. Special care may be needed.  Overdosage: If you think you have taken too much of this medicine contact a poison control center or emergency room at once.  NOTE: This medicine is only for you. Do not share this medicine with others.  What if I miss a dose?  If you miss a dose, take it as soon as you can. If it is almost time for your next dose, take only that dose. Do not take double or extra doses.  What may interact with this medication?  Do not take this medication with any of the following:  Metyrapone  Mifepristone  This medication may also interact with the following:  Aminoglutethimide  Amphotericin B  Aspirin and aspirin-like medications  Barbiturates  Certain medications for diabetes, like glipizide or glyburide  Cholestyramine  Cholinesterase inhibitors  Cyclosporine  Digoxin  Diuretics  Ephedrine  Female hormones, like estrogens and birth control pills  Isoniazid  Ketoconazole  NSAIDS, medications for pain and inflammation, like ibuprofen or naproxen  Phenytoin  Rifampin  Toxoids  Vaccines  Warfarin  This list may not describe all possible interactions. Give your health care provider a list of all the medicines, herbs, non-prescription drugs, or dietary supplements you use. Also tell them if you smoke, drink alcohol, or use illegal drugs. Some items may interact with your medicine.  What should I watch for while using this medication?  Visit your care team for regular checks on your progress. If you are taking this medication over a prolonged period, carry an identification card with your name and address, the type and dose of your medication, and your care team's name and  address.  This medication may increase your risk of getting an infection. Tell your care team if you are around anyone with measles or chickenpox, or if you develop sores or blisters that do not heal properly.  If you are going to have surgery, tell your care team that you have taken this medication within the last twelve months.  Ask your care team about your diet. You may need to lower the amount of salt you eat.  This medication may increase blood sugar. Ask your care team if changes in diet or medications are needed if you have diabetes.  What side effects may I notice from receiving this medication?  Side effects that you should report to your care team as soon as possible:  Allergic reactions--skin rash, itching, hives, swelling of the face, lips, tongue, or throat  Cushing syndrome--increased fat around the midsection, upper back, neck, or face, pink or purple stretch marks on the skin, thinning, fragile skin that easily bruises, unexpected hair growth  High blood sugar (hyperglycemia)--increased thirst or amount of urine, unusual weakness or fatigue, blurry vision  Increase in blood pressure  Infection--fever, chills, cough, sore throat, wounds that don't heal, pain or trouble when passing urine, general feeling of discomfort or being unwell  Low adrenal gland function--nausea, vomiting, loss of appetite, unusual weakness or fatigue, dizziness  Mood and behavior changes--anxiety, nervousness, confusion, hallucinations, irritability, hostility, thoughts of suicide or self-harm, worsening mood, feelings of depression  Stomach bleeding--bloody or black, tar-like stools, vomiting blood or brown material that looks like coffee grounds  Swelling of the ankles, hands, or feet  Side effects that usually do not require medical attention (report to your care team if they continue or are bothersome):  Acne  General discomfort and fatigue  Headache  Increase in appetite  Nausea  Trouble sleeping  Weight gain  This list  may not describe all possible side effects. Call your doctor for medical advice about side effects. You may report side effects to FDA at 4-166-ZHT-4266.  Where should I keep my medication?  Keep out of the reach of children.  Store at room temperature between 15 and 30 degrees C (59 and 86 degrees F). Keep container tightly closed. Throw away any unused medication after the expiration date.  NOTE: This sheet is a summary. It may not cover all possible information. If you have questions about this medicine, talk to your doctor, pharmacist, or health care provider.  © 2023 [employer] (2009-02-07 00:00:00)

## 2024-03-26 NOTE — PROGRESS NOTES
Pt discharged home with family. Home oxygen delivered and representative explained safe usage. JUAN MANUEL Sandoval began discharge process and JUAN MANUEL Alarcon printed discharge education. Education reviewed with pt. Pt understands and agreeable. Opportunity for questions provided. PIV removed. Pt taken out via wheelchair.